# Patient Record
Sex: FEMALE | Race: WHITE | NOT HISPANIC OR LATINO
[De-identification: names, ages, dates, MRNs, and addresses within clinical notes are randomized per-mention and may not be internally consistent; named-entity substitution may affect disease eponyms.]

---

## 2017-01-02 ENCOUNTER — RESULT REVIEW (OUTPATIENT)
Age: 62
End: 2017-01-02

## 2017-01-03 ENCOUNTER — INPATIENT (INPATIENT)
Facility: HOSPITAL | Age: 62
LOS: 1 days | Discharge: HOME | End: 2017-01-05
Attending: PLASTIC SURGERY | Admitting: PLASTIC SURGERY
Payer: COMMERCIAL

## 2017-01-03 VITALS
RESPIRATION RATE: 18 BRPM | HEART RATE: 77 BPM | DIASTOLIC BLOOD PRESSURE: 88 MMHG | TEMPERATURE: 98 F | SYSTOLIC BLOOD PRESSURE: 132 MMHG | OXYGEN SATURATION: 100 %

## 2017-01-03 DIAGNOSIS — Z98.89 OTHER SPECIFIED POSTPROCEDURAL STATES: Chronic | ICD-10-CM

## 2017-01-03 DIAGNOSIS — T79.2XXA TRAUMATIC SECONDARY AND RECURRENT HEMORRHAGE AND SEROMA, INITIAL ENCOUNTER: ICD-10-CM

## 2017-01-03 DIAGNOSIS — Z90.710 ACQUIRED ABSENCE OF BOTH CERVIX AND UTERUS: Chronic | ICD-10-CM

## 2017-01-03 LAB
ALBUMIN SERPL ELPH-MCNC: 3.9 G/DL — SIGNIFICANT CHANGE UP (ref 3.3–5)
ALP SERPL-CCNC: 99 U/L — SIGNIFICANT CHANGE UP (ref 40–120)
ALT FLD-CCNC: 10 U/L — SIGNIFICANT CHANGE UP (ref 4–33)
APTT BLD: 31.5 SEC — SIGNIFICANT CHANGE UP (ref 27.5–37.4)
AST SERPL-CCNC: 12 U/L — SIGNIFICANT CHANGE UP (ref 4–32)
BASE EXCESS BLDV CALC-SCNC: 3.6 MMOL/L — SIGNIFICANT CHANGE UP
BASOPHILS # BLD AUTO: 0.04 K/UL — SIGNIFICANT CHANGE UP (ref 0–0.2)
BASOPHILS NFR BLD AUTO: 0.3 % — SIGNIFICANT CHANGE UP (ref 0–2)
BILIRUB SERPL-MCNC: 0.2 MG/DL — SIGNIFICANT CHANGE UP (ref 0.2–1.2)
BLD GP AB SCN SERPL QL: NEGATIVE — SIGNIFICANT CHANGE UP
BLOOD GAS VENOUS - CREATININE: 0.8 MG/DL — SIGNIFICANT CHANGE UP (ref 0.5–1.3)
BUN SERPL-MCNC: 16 MG/DL — SIGNIFICANT CHANGE UP (ref 7–23)
CALCIUM SERPL-MCNC: 11.4 MG/DL — HIGH (ref 8.4–10.5)
CHLORIDE BLDV-SCNC: 101 MMOL/L — SIGNIFICANT CHANGE UP (ref 96–108)
CHLORIDE SERPL-SCNC: 96 MMOL/L — LOW (ref 98–107)
CO2 SERPL-SCNC: 24 MMOL/L — SIGNIFICANT CHANGE UP (ref 22–31)
CREAT SERPL-MCNC: 0.78 MG/DL — SIGNIFICANT CHANGE UP (ref 0.5–1.3)
EOSINOPHIL # BLD AUTO: 0.15 K/UL — SIGNIFICANT CHANGE UP (ref 0–0.5)
EOSINOPHIL NFR BLD AUTO: 1.2 % — SIGNIFICANT CHANGE UP (ref 0–6)
GAS PNL BLDV: 134 MMOL/L — LOW (ref 136–146)
GLUCOSE BLDV-MCNC: 138 — HIGH (ref 70–99)
GLUCOSE SERPL-MCNC: 153 MG/DL — HIGH (ref 70–99)
GRAM STN FLD: SIGNIFICANT CHANGE UP
HCO3 BLDV-SCNC: 27 MMOL/L — SIGNIFICANT CHANGE UP (ref 20–27)
HCT VFR BLD CALC: 32.4 % — LOW (ref 34.5–45)
HCT VFR BLDV CALC: 33.5 % — LOW (ref 34.5–45)
HGB BLD-MCNC: 10.6 G/DL — LOW (ref 11.5–15.5)
HGB BLDV-MCNC: 10.9 G/DL — LOW (ref 11.5–15.5)
IMM GRANULOCYTES NFR BLD AUTO: 1 % — SIGNIFICANT CHANGE UP (ref 0–1.5)
INR BLD: 1.03 — SIGNIFICANT CHANGE UP (ref 0.87–1.18)
LACTATE BLDV-MCNC: 1.9 MMOL/L — SIGNIFICANT CHANGE UP (ref 0.5–2)
LYMPHOCYTES # BLD AUTO: 2.64 K/UL — SIGNIFICANT CHANGE UP (ref 1–3.3)
LYMPHOCYTES # BLD AUTO: 20.4 % — SIGNIFICANT CHANGE UP (ref 13–44)
MCHC RBC-ENTMCNC: 27.4 PG — SIGNIFICANT CHANGE UP (ref 27–34)
MCHC RBC-ENTMCNC: 32.7 % — SIGNIFICANT CHANGE UP (ref 32–36)
MCV RBC AUTO: 83.7 FL — SIGNIFICANT CHANGE UP (ref 80–100)
MONOCYTES # BLD AUTO: 1.33 K/UL — HIGH (ref 0–0.9)
MONOCYTES NFR BLD AUTO: 10.3 % — SIGNIFICANT CHANGE UP (ref 2–14)
NEUTROPHILS # BLD AUTO: 8.66 K/UL — HIGH (ref 1.8–7.4)
NEUTROPHILS NFR BLD AUTO: 66.8 % — SIGNIFICANT CHANGE UP (ref 43–77)
PCO2 BLDV: 46 MMHG — SIGNIFICANT CHANGE UP (ref 41–51)
PH BLDV: 7.4 PH — SIGNIFICANT CHANGE UP (ref 7.32–7.43)
PLATELET # BLD AUTO: 320 K/UL — SIGNIFICANT CHANGE UP (ref 150–400)
PMV BLD: 9.5 FL — SIGNIFICANT CHANGE UP (ref 7–13)
PO2 BLDV: 44 MMHG — HIGH (ref 35–40)
POTASSIUM BLDV-SCNC: 5.4 MMOL/L — HIGH (ref 3.4–4.5)
POTASSIUM SERPL-MCNC: 3.8 MMOL/L — SIGNIFICANT CHANGE UP (ref 3.5–5.3)
POTASSIUM SERPL-SCNC: 3.8 MMOL/L — SIGNIFICANT CHANGE UP (ref 3.5–5.3)
PROT SERPL-MCNC: 7.8 G/DL — SIGNIFICANT CHANGE UP (ref 6–8.3)
PROTHROM AB SERPL-ACNC: 11.8 SEC — SIGNIFICANT CHANGE UP (ref 10–13.1)
RBC # BLD: 3.87 M/UL — SIGNIFICANT CHANGE UP (ref 3.8–5.2)
RBC # FLD: 14.7 % — HIGH (ref 10.3–14.5)
RH IG SCN BLD-IMP: POSITIVE — SIGNIFICANT CHANGE UP
SAO2 % BLDV: 74.7 % — SIGNIFICANT CHANGE UP (ref 60–85)
SODIUM SERPL-SCNC: 136 MMOL/L — SIGNIFICANT CHANGE UP (ref 135–145)
SPECIMEN SOURCE: SIGNIFICANT CHANGE UP
WBC # BLD: 12.95 K/UL — HIGH (ref 3.8–10.5)
WBC # FLD AUTO: 12.95 K/UL — HIGH (ref 3.8–10.5)

## 2017-01-03 PROCEDURE — 11042 DBRDMT SUBQ TIS 1ST 20SQCM/<: CPT

## 2017-01-03 PROCEDURE — 49900 REPAIR OF ABDOMINAL WALL: CPT | Mod: 78

## 2017-01-03 PROCEDURE — 88304 TISSUE EXAM BY PATHOLOGIST: CPT | Mod: 26

## 2017-01-03 PROCEDURE — 11045 DBRDMT SUBQ TISS EACH ADDL: CPT

## 2017-01-03 RX ORDER — PANTOPRAZOLE SODIUM 20 MG/1
40 TABLET, DELAYED RELEASE ORAL
Qty: 0 | Refills: 0 | Status: DISCONTINUED | OUTPATIENT
Start: 2017-01-03 | End: 2017-01-05

## 2017-01-03 RX ORDER — METOPROLOL TARTRATE 50 MG
200 TABLET ORAL
Qty: 0 | Refills: 0 | Status: DISCONTINUED | OUTPATIENT
Start: 2017-01-03 | End: 2017-01-05

## 2017-01-03 RX ORDER — AMPICILLIN SODIUM AND SULBACTAM SODIUM 250; 125 MG/ML; MG/ML
1.5 INJECTION, POWDER, FOR SUSPENSION INTRAMUSCULAR; INTRAVENOUS EVERY 6 HOURS
Qty: 0 | Refills: 0 | Status: DISCONTINUED | OUTPATIENT
Start: 2017-01-03 | End: 2017-01-05

## 2017-01-03 RX ORDER — ALLOPURINOL 300 MG
100 TABLET ORAL DAILY
Qty: 0 | Refills: 0 | Status: DISCONTINUED | OUTPATIENT
Start: 2017-01-03 | End: 2017-01-05

## 2017-01-03 RX ORDER — GLUCAGON INJECTION, SOLUTION 0.5 MG/.1ML
1 INJECTION, SOLUTION SUBCUTANEOUS ONCE
Qty: 0 | Refills: 0 | Status: DISCONTINUED | OUTPATIENT
Start: 2017-01-03 | End: 2017-01-05

## 2017-01-03 RX ORDER — ASPIRIN/CALCIUM CARB/MAGNESIUM 324 MG
81 TABLET ORAL DAILY
Qty: 0 | Refills: 0 | Status: DISCONTINUED | OUTPATIENT
Start: 2017-01-03 | End: 2017-01-03

## 2017-01-03 RX ORDER — ASPIRIN/CALCIUM CARB/MAGNESIUM 324 MG
81 TABLET ORAL DAILY
Qty: 0 | Refills: 0 | Status: DISCONTINUED | OUTPATIENT
Start: 2017-01-03 | End: 2017-01-05

## 2017-01-03 RX ORDER — DEXTROSE 50 % IN WATER 50 %
1 SYRINGE (ML) INTRAVENOUS ONCE
Qty: 0 | Refills: 0 | Status: DISCONTINUED | OUTPATIENT
Start: 2017-01-03 | End: 2017-01-05

## 2017-01-03 RX ORDER — ACETAMINOPHEN 500 MG
650 TABLET ORAL EVERY 6 HOURS
Qty: 0 | Refills: 0 | Status: DISCONTINUED | OUTPATIENT
Start: 2017-01-03 | End: 2017-01-05

## 2017-01-03 RX ORDER — DIPHENHYDRAMINE HCL 50 MG
50 CAPSULE ORAL EVERY 4 HOURS
Qty: 0 | Refills: 0 | Status: DISCONTINUED | OUTPATIENT
Start: 2017-01-03 | End: 2017-01-05

## 2017-01-03 RX ORDER — INSULIN LISPRO 100/ML
VIAL (ML) SUBCUTANEOUS AT BEDTIME
Qty: 0 | Refills: 0 | Status: DISCONTINUED | OUTPATIENT
Start: 2017-01-03 | End: 2017-01-05

## 2017-01-03 RX ORDER — ONDANSETRON 8 MG/1
4 TABLET, FILM COATED ORAL EVERY 6 HOURS
Qty: 0 | Refills: 0 | Status: DISCONTINUED | OUTPATIENT
Start: 2017-01-03 | End: 2017-01-05

## 2017-01-03 RX ORDER — VALSARTAN 80 MG/1
160 TABLET ORAL DAILY
Qty: 0 | Refills: 0 | Status: DISCONTINUED | OUTPATIENT
Start: 2017-01-03 | End: 2017-01-05

## 2017-01-03 RX ORDER — SODIUM CHLORIDE 9 MG/ML
1000 INJECTION, SOLUTION INTRAVENOUS
Qty: 0 | Refills: 0 | Status: DISCONTINUED | OUTPATIENT
Start: 2017-01-03 | End: 2017-01-05

## 2017-01-03 RX ORDER — DEXTROSE 50 % IN WATER 50 %
25 SYRINGE (ML) INTRAVENOUS ONCE
Qty: 0 | Refills: 0 | Status: DISCONTINUED | OUTPATIENT
Start: 2017-01-03 | End: 2017-01-03

## 2017-01-03 RX ORDER — DEXTROSE 50 % IN WATER 50 %
25 SYRINGE (ML) INTRAVENOUS ONCE
Qty: 0 | Refills: 0 | Status: DISCONTINUED | OUTPATIENT
Start: 2017-01-03 | End: 2017-01-05

## 2017-01-03 RX ORDER — SODIUM CHLORIDE 9 MG/ML
1000 INJECTION, SOLUTION INTRAVENOUS
Qty: 0 | Refills: 0 | Status: DISCONTINUED | OUTPATIENT
Start: 2017-01-03 | End: 2017-01-04

## 2017-01-03 RX ORDER — HEPARIN SODIUM 5000 [USP'U]/ML
5000 INJECTION INTRAVENOUS; SUBCUTANEOUS EVERY 8 HOURS
Qty: 0 | Refills: 0 | Status: DISCONTINUED | OUTPATIENT
Start: 2017-01-03 | End: 2017-01-05

## 2017-01-03 RX ORDER — DOCUSATE SODIUM 100 MG
100 CAPSULE ORAL THREE TIMES A DAY
Qty: 0 | Refills: 0 | Status: DISCONTINUED | OUTPATIENT
Start: 2017-01-03 | End: 2017-01-05

## 2017-01-03 RX ORDER — SPIRONOLACTONE 25 MG/1
25 TABLET, FILM COATED ORAL DAILY
Qty: 0 | Refills: 0 | Status: DISCONTINUED | OUTPATIENT
Start: 2017-01-03 | End: 2017-01-05

## 2017-01-03 RX ORDER — DEXTROSE 50 % IN WATER 50 %
12.5 SYRINGE (ML) INTRAVENOUS ONCE
Qty: 0 | Refills: 0 | Status: DISCONTINUED | OUTPATIENT
Start: 2017-01-03 | End: 2017-01-05

## 2017-01-03 RX ORDER — FENTANYL CITRATE 50 UG/ML
25 INJECTION INTRAVENOUS
Qty: 0 | Refills: 0 | Status: DISCONTINUED | OUTPATIENT
Start: 2017-01-03 | End: 2017-01-03

## 2017-01-03 RX ORDER — HYDROMORPHONE HYDROCHLORIDE 2 MG/ML
0.5 INJECTION INTRAMUSCULAR; INTRAVENOUS; SUBCUTANEOUS
Qty: 0 | Refills: 0 | Status: DISCONTINUED | OUTPATIENT
Start: 2017-01-03 | End: 2017-01-03

## 2017-01-03 RX ORDER — INSULIN LISPRO 100/ML
VIAL (ML) SUBCUTANEOUS
Qty: 0 | Refills: 0 | Status: DISCONTINUED | OUTPATIENT
Start: 2017-01-03 | End: 2017-01-05

## 2017-01-03 RX ORDER — ONDANSETRON 8 MG/1
4 TABLET, FILM COATED ORAL ONCE
Qty: 0 | Refills: 0 | Status: COMPLETED | OUTPATIENT
Start: 2017-01-03 | End: 2017-01-03

## 2017-01-03 RX ORDER — VALSARTAN 80 MG/1
160 TABLET ORAL DAILY
Qty: 0 | Refills: 0 | Status: DISCONTINUED | OUTPATIENT
Start: 2017-01-03 | End: 2017-01-03

## 2017-01-03 RX ORDER — METOPROLOL TARTRATE 50 MG
200 TABLET ORAL
Qty: 0 | Refills: 0 | Status: DISCONTINUED | OUTPATIENT
Start: 2017-01-03 | End: 2017-01-03

## 2017-01-03 RX ADMIN — Medication 100 MILLIGRAM(S): at 21:32

## 2017-01-03 RX ADMIN — HEPARIN SODIUM 5000 UNIT(S): 5000 INJECTION INTRAVENOUS; SUBCUTANEOUS at 21:31

## 2017-01-03 RX ADMIN — ONDANSETRON 4 MILLIGRAM(S): 8 TABLET, FILM COATED ORAL at 18:50

## 2017-01-03 RX ADMIN — HYDROMORPHONE HYDROCHLORIDE 0.5 MILLIGRAM(S): 2 INJECTION INTRAMUSCULAR; INTRAVENOUS; SUBCUTANEOUS at 20:05

## 2017-01-03 RX ADMIN — SODIUM CHLORIDE 75 MILLILITER(S): 9 INJECTION, SOLUTION INTRAVENOUS at 18:30

## 2017-01-03 RX ADMIN — HYDROMORPHONE HYDROCHLORIDE 0.5 MILLIGRAM(S): 2 INJECTION INTRAMUSCULAR; INTRAVENOUS; SUBCUTANEOUS at 19:50

## 2017-01-03 RX ADMIN — Medication 200 MILLIGRAM(S): at 22:23

## 2017-01-03 NOTE — ED ADULT NURSE REASSESSMENT NOTE - NS ED NURSE REASSESS COMMENT FT1
Report received from SHAILESH Issa at 1210. Pt in slot 26, awake, A&Ox3, respirations even unlabored. Pt reporting wound site 'discomfort', denies pain, reporting discomfort as tolerable at this time, denies need for pain medication. VS per flow,  at bedside, pt in no apparent distress at this time. Safety maintained. Will continue to monitor.

## 2017-01-03 NOTE — ED PROVIDER NOTE - OBJECTIVE STATEMENT
61F, h/o DM2, gout, HTN, GERD, L mastectomy 11/16, reconstructive breast surg Dr Merrill using abdo donor site, has been having drainage from abdo wall drainage site worse R side, s/p drain insertion and then removal. Recently opened up by Dr merrill and drainage has increased, also some pain and swelling on L side. No fever, No breast pain/drainage. Review of Symptoms in all systems otherwise negative, except as indicated

## 2017-01-03 NOTE — ED ADULT NURSE NOTE - PMH
Breast tumor  malignant  left breast 08/2016  Diabetes  type II  Gout    Hypertension    Irritable bowel syndrome with diarrhea    Reflux

## 2017-01-03 NOTE — ED ADULT TRIAGE NOTE - CHIEF COMPLAINT QUOTE
s/p b/l mastectomy w reconstruction 11/8/16.  pt c/o increased drainage from incision and a "hole"  to area  (rt abd) x 4 days.  Sent by plastic surgeon for eval.  h/o breast cancer.

## 2017-01-03 NOTE — ED ADULT NURSE REASSESSMENT NOTE - NS ED NURSE REASSESS COMMENT FT1
Pt in slot 26 with bed assignment in [facility]. Pt awake, A&Ox3, respirations even, unlabored. Pt reporting discomfort in wound site area as 'the same as before', site covered with gauze. VS per flow,  at bedside. Pt aware of NPO status, reporting last PO intake as 0800 this morning. 20g PIV intact in R AC. Report given to [facility] SHAILEHS Velasquez. Safety maintained in ED. Pt in slot 26, Pt awake, A&Ox3, respirations even, unlabored. Pt reporting discomfort in wound site area as 'the same as before', site covered with gauze. VS per flow,  at bedside. Pt aware of NPO status, reporting last PO intake as 0800 this morning. 20g PIV intact in R AC. Report given to [facility] SHAILESH Velasquez. Safety maintained in ED.    Addendum: Pt to go to OR, report given to SHAILESH Smiley. Pt aware of plan of care, verbalizes understanding. . Safety maintained in ED.  at bedside.

## 2017-01-03 NOTE — ED PROVIDER NOTE - PSH
H/O total hysterectomy  2002  History of appendectomy    History of cholecystectomy    History of tonsillectomy

## 2017-01-03 NOTE — ED PROVIDER NOTE - GASTROINTESTINAL, MLM
Abdomen soft, non-tender, no guarding. except large horizontal abdo wall surgical wound site with open area on R with copious serosanguinous drainag, also swelling and mild tenderness on L side

## 2017-01-03 NOTE — ED PROVIDER NOTE - MEDICAL DECISION MAKING DETAILS
Large post op seromas in abdo wall. D/w plastic surgery who requests admission to Dr Thomas. Labs sent, NPO

## 2017-01-03 NOTE — ED ADULT NURSE NOTE - OBJECTIVE STATEMENT
pt received to 26, aox3.  pt c/o post op complication, pt had incision to abdomen for the purpose of reconstructive breast surgery, pt has large open area to right side of abdomen where incision was with purulent drainage.  pt arrives with dressing in place, awaitnig MD frye, will monitor

## 2017-01-04 ENCOUNTER — TRANSCRIPTION ENCOUNTER (OUTPATIENT)
Age: 62
End: 2017-01-04

## 2017-01-04 LAB
BUN SERPL-MCNC: 13 MG/DL — SIGNIFICANT CHANGE UP (ref 7–23)
CALCIUM SERPL-MCNC: 10.4 MG/DL — SIGNIFICANT CHANGE UP (ref 8.4–10.5)
CHLORIDE SERPL-SCNC: 99 MMOL/L — SIGNIFICANT CHANGE UP (ref 98–107)
CO2 SERPL-SCNC: 26 MMOL/L — SIGNIFICANT CHANGE UP (ref 22–31)
CREAT SERPL-MCNC: 0.68 MG/DL — SIGNIFICANT CHANGE UP (ref 0.5–1.3)
GLUCOSE SERPL-MCNC: 145 MG/DL — HIGH (ref 70–99)
HBA1C BLD-MCNC: 6.9 % — HIGH (ref 4–5.6)
HCT VFR BLD CALC: 27.6 % — LOW (ref 34.5–45)
HGB BLD-MCNC: 8.9 G/DL — LOW (ref 11.5–15.5)
MCHC RBC-ENTMCNC: 26.6 PG — LOW (ref 27–34)
MCHC RBC-ENTMCNC: 32.2 % — SIGNIFICANT CHANGE UP (ref 32–36)
MCV RBC AUTO: 82.4 FL — SIGNIFICANT CHANGE UP (ref 80–100)
PLATELET # BLD AUTO: 296 K/UL — SIGNIFICANT CHANGE UP (ref 150–400)
PMV BLD: 9.5 FL — SIGNIFICANT CHANGE UP (ref 7–13)
POTASSIUM SERPL-MCNC: 4 MMOL/L — SIGNIFICANT CHANGE UP (ref 3.5–5.3)
POTASSIUM SERPL-SCNC: 4 MMOL/L — SIGNIFICANT CHANGE UP (ref 3.5–5.3)
RBC # BLD: 3.35 M/UL — LOW (ref 3.8–5.2)
RBC # FLD: 14.6 % — HIGH (ref 10.3–14.5)
SODIUM SERPL-SCNC: 140 MMOL/L — SIGNIFICANT CHANGE UP (ref 135–145)
WBC # BLD: 9.17 K/UL — SIGNIFICANT CHANGE UP (ref 3.8–10.5)
WBC # FLD AUTO: 9.17 K/UL — SIGNIFICANT CHANGE UP (ref 3.8–10.5)

## 2017-01-04 RX ORDER — CHLORTHALIDONE 50 MG
25 TABLET ORAL DAILY
Qty: 0 | Refills: 0 | Status: DISCONTINUED | OUTPATIENT
Start: 2017-01-04 | End: 2017-01-05

## 2017-01-04 RX ADMIN — AMPICILLIN SODIUM AND SULBACTAM SODIUM 100 GRAM(S): 250; 125 INJECTION, POWDER, FOR SUSPENSION INTRAMUSCULAR; INTRAVENOUS at 23:54

## 2017-01-04 RX ADMIN — AMPICILLIN SODIUM AND SULBACTAM SODIUM 100 GRAM(S): 250; 125 INJECTION, POWDER, FOR SUSPENSION INTRAMUSCULAR; INTRAVENOUS at 17:06

## 2017-01-04 RX ADMIN — Medication 200 MILLIGRAM(S): at 05:45

## 2017-01-04 RX ADMIN — AMPICILLIN SODIUM AND SULBACTAM SODIUM 100 GRAM(S): 250; 125 INJECTION, POWDER, FOR SUSPENSION INTRAMUSCULAR; INTRAVENOUS at 00:22

## 2017-01-04 RX ADMIN — Medication 100 MILLIGRAM(S): at 13:40

## 2017-01-04 RX ADMIN — HEPARIN SODIUM 5000 UNIT(S): 5000 INJECTION INTRAVENOUS; SUBCUTANEOUS at 21:33

## 2017-01-04 RX ADMIN — Medication 100 MILLIGRAM(S): at 21:33

## 2017-01-04 RX ADMIN — Medication 100 MILLIGRAM(S): at 05:45

## 2017-01-04 RX ADMIN — SPIRONOLACTONE 25 MILLIGRAM(S): 25 TABLET, FILM COATED ORAL at 05:45

## 2017-01-04 RX ADMIN — Medication 1: at 12:25

## 2017-01-04 RX ADMIN — HEPARIN SODIUM 5000 UNIT(S): 5000 INJECTION INTRAVENOUS; SUBCUTANEOUS at 13:40

## 2017-01-04 RX ADMIN — Medication 81 MILLIGRAM(S): at 11:36

## 2017-01-04 RX ADMIN — AMPICILLIN SODIUM AND SULBACTAM SODIUM 100 GRAM(S): 250; 125 INJECTION, POWDER, FOR SUSPENSION INTRAMUSCULAR; INTRAVENOUS at 05:45

## 2017-01-04 RX ADMIN — VALSARTAN 160 MILLIGRAM(S): 80 TABLET ORAL at 05:45

## 2017-01-04 RX ADMIN — Medication 100 MILLIGRAM(S): at 11:36

## 2017-01-04 RX ADMIN — PANTOPRAZOLE SODIUM 40 MILLIGRAM(S): 20 TABLET, DELAYED RELEASE ORAL at 07:48

## 2017-01-04 RX ADMIN — AMPICILLIN SODIUM AND SULBACTAM SODIUM 100 GRAM(S): 250; 125 INJECTION, POWDER, FOR SUSPENSION INTRAMUSCULAR; INTRAVENOUS at 11:36

## 2017-01-04 RX ADMIN — HEPARIN SODIUM 5000 UNIT(S): 5000 INJECTION INTRAVENOUS; SUBCUTANEOUS at 05:45

## 2017-01-04 RX ADMIN — Medication 25 MILLIGRAM(S): at 16:59

## 2017-01-04 RX ADMIN — SODIUM CHLORIDE 75 MILLILITER(S): 9 INJECTION, SOLUTION INTRAVENOUS at 00:24

## 2017-01-04 NOTE — DISCHARGE NOTE ADULT - CARE PROVIDER_API CALL
eFrcho Thomas), Plastic Surgery  [address] [place] [address]  Phone: [phone]  Fax: [phone]

## 2017-01-04 NOTE — DISCHARGE NOTE ADULT - PLAN OF CARE
s/p abdominal wall washout and debridement Call 911 and return to the ED for chest pain, shortness of breath, significant increase in pain, or significant change in color of surgical sites. Please monitor drainage in bulb. -continue HONORIO drains, empty and record output daily  -dressing changes with dry gauze and tape daily or as needed for saturation  -follow-up with Dr. Thomas in 1 week.  Please call [phone] for an appointment.  -Call 911 and return to the ED for chest pain, shortness of breath, significant increase in pain, or significant change in color of surgical sites. Please monitor drainage in bulb. see below Please call your cardiologist and make an appointment to see him early next week.   Please take half of your toprol dose (this would be 100mg twice daily) for now and hold your diovan unless your blood pressure becomes elevated as reconmmended by Dr Simpson.

## 2017-01-04 NOTE — DISCHARGE NOTE ADULT - MEDICATION SUMMARY - MEDICATIONS TO TAKE
I will START or STAY ON the medications listed below when I get home from the hospital:    spironolactone 25 mg oral tablet  -- 1 tab(s) by mouth once a day  -- Indication: For Hypertension    aspirin 81 mg oral tablet  -- 1 tab(s) by mouth once a day  -- Indication: For cardioprotection/LINDA flap protection    Percocet 5/325  -- 1 tab(s) by mouth every 4 hours, As Needed -for severe pain MDD:8  -- Indication: For moderate-severe pain    valsartan  -- 160 milligram(s) by mouth once a day  -- Indication: For Hypertension    metFORMIN 1000 mg oral tablet  -- 1000 milligram(s) by mouth 2 times a day  -- Indication: For Diabetes    allopurinol  -- 100 milligram(s) by mouth once a day  -- Indication: For Gout    pravastatin 40 mg oral tablet  -- 1 tab(s) by mouth once a day  -- Indication: For hyperlipidemia    metoprolol  -- 200 milligram(s) by mouth 2 times a day  -- Indication: For hypertension    chlorthalidone 25 mg oral tablet  -- 1 tab(s) by mouth once a day  -- Indication: For hypertension    docusate sodium 100 mg oral capsule  -- 1 cap(s) by mouth 3 times a day  -- Indication: For constipation secondary to pain medication    Bactrim  mg-160 mg oral tablet  -- 1 tab(s) by mouth 2 times a day  -- Avoid prolonged or excessive exposure to direct and/or artificial sunlight while taking this medication.  Finish all this medication unless otherwise directed by prescriber.  Medication should be taken with plenty of water.    -- Indication: For Abdominal wound infection    omeprazole  -- 20 milligram(s) by mouth once a day  -- Indication: For GERD I will START or STAY ON the medications listed below when I get home from the hospital:    spironolactone 25 mg oral tablet  -- 1 tab(s) by mouth once a day  -- Indication: For Hypertension    Percocet 5/325  -- 1 tab(s) by mouth every 4 hours, As Needed -for severe pain MDD:8  -- Indication: For moderate-severe pain    aspirin 81 mg oral tablet  -- 1 tab(s) by mouth once a day  -- Indication: For cardioprotection/LINDA flap protection    metFORMIN 1000 mg oral tablet  -- 1000 milligram(s) by mouth 2 times a day  -- Indication: For Diabetes    allopurinol  -- 100 milligram(s) by mouth once a day  -- Indication: For Gout    pravastatin 40 mg oral tablet  -- 1 tab(s) by mouth once a day  -- Indication: For hyperlipidemia    metoprolol tartrate 100 mg oral tablet  -- 1 tab(s) by mouth 2 times a day  -- Indication: For hypertension    chlorthalidone 25 mg oral tablet  -- 1 tab(s) by mouth once a day  -- Indication: For hypertension    docusate sodium 100 mg oral capsule  -- 1 cap(s) by mouth 3 times a day  -- Indication: For constipation secondary to pain medication    Bactrim  mg-160 mg oral tablet  -- 1 tab(s) by mouth 2 times a day  -- Avoid prolonged or excessive exposure to direct and/or artificial sunlight while taking this medication.  Finish all this medication unless otherwise directed by prescriber.  Medication should be taken with plenty of water.    -- Indication: For Abdominal wound infection    omeprazole  -- 20 milligram(s) by mouth once a day  -- Indication: For GERD

## 2017-01-04 NOTE — DISCHARGE NOTE ADULT - MEDICATION SUMMARY - MEDICATIONS TO STOP TAKING
I will STOP taking the medications listed below when I get home from the hospital:  None I will STOP taking the medications listed below when I get home from the hospital:    valsartan  -- 160 milligram(s) by mouth once a day

## 2017-01-04 NOTE — DISCHARGE NOTE ADULT - MEDICATION SUMMARY - MEDICATIONS TO CHANGE
I will SWITCH the dose or number of times a day I take the medications listed below when I get home from the hospital:  None I will SWITCH the dose or number of times a day I take the medications listed below when I get home from the hospital:    metoprolol  -- 200 milligram(s) by mouth 2 times a day

## 2017-01-04 NOTE — DISCHARGE NOTE ADULT - CONDITIONS AT DISCHARGE
Pt is alert and oriented. Vital signs are stable. Pt is tolerating regular diet. Glucose level maintained. No nausea/vomiting. Pt verbalizes understanding of HONORIO teaching. Reinforcement done with teach back. Supplies given to pt.

## 2017-01-04 NOTE — DISCHARGE NOTE ADULT - ADDITIONAL INSTRUCTIONS
Please follow up with Dr. Thomas within x1 week after discharge from the hospital. You may call [phone] to schedule an appointment. Please follow up with your primary care provider and cardiologist within one week regarding your recent hospitalization.

## 2017-01-04 NOTE — DISCHARGE NOTE ADULT - PATIENT PORTAL LINK FT
“You can access the Follow[facility] Patient Portal, offered by [facility], by registering with the following website: [email or web address]”

## 2017-01-04 NOTE — DISCHARGE NOTE ADULT - CARE PROVIDERS DIRECT ADDRESSES
[email or web address]

## 2017-01-04 NOTE — DISCHARGE NOTE ADULT - CARE PLAN
Principal Discharge DX:	Seroma of musculoskeletal structure after musculoskeletal system procedure  Goal:	s/p abdominal wall washout and debridement  Instructions for follow-up, activity and diet:	Call 911 and return to the ED for chest pain, shortness of breath, significant increase in pain, or significant change in color of surgical sites. Please monitor drainage in bulb. Principal Discharge DX:	Seroma of musculoskeletal structure after musculoskeletal system procedure  Goal:	s/p abdominal wall washout and debridement  Instructions for follow-up, activity and diet:	-continue HONORIO drains, empty and record output daily  -dressing changes with dry gauze and tape daily or as needed for saturation  -follow-up with Dr. Thomas in 1 week.  Please call [phone] for an appointment.  -Call 911 and return to the ED for chest pain, shortness of breath, significant increase in pain, or significant change in color of surgical sites. Please monitor drainage in bulb. Principal Discharge DX:	Seroma of musculoskeletal structure after musculoskeletal system procedure  Goal:	s/p abdominal wall washout and debridement  Instructions for follow-up, activity and diet:	-continue HONORIO drains, empty and record output daily  -dressing changes with dry gauze and tape daily or as needed for saturation  -follow-up with Dr. Thomas in 1 week.  Please call [phone] for an appointment.  -Call 911 and return to the ED for chest pain, shortness of breath, significant increase in pain, or significant change in color of surgical sites. Please monitor drainage in bulb. Principal Discharge DX:	Seroma of musculoskeletal structure after musculoskeletal system procedure  Goal:	s/p abdominal wall washout and debridement  Instructions for follow-up, activity and diet:	-continue HONORIO drains, empty and record output daily  -dressing changes with dry gauze and tape daily or as needed for saturation  -follow-up with Dr. Thomas in 1 week.  Please call [phone] for an appointment.  -Call 911 and return to the ED for chest pain, shortness of breath, significant increase in pain, or significant change in color of surgical sites. Please monitor drainage in bulb. Principal Discharge DX:	Seroma of musculoskeletal structure after musculoskeletal system procedure  Goal:	s/p abdominal wall washout and debridement  Instructions for follow-up, activity and diet:	-continue HONORIO drains, empty and record output daily  -dressing changes with dry gauze and tape daily or as needed for saturation  -follow-up with Dr. Thomas in 1 week.  Please call [phone] for an appointment.  -Call 911 and return to the ED for chest pain, shortness of breath, significant increase in pain, or significant change in color of surgical sites. Please monitor drainage in bulb.  Secondary Diagnosis:	Secondary hypertension  Goal:	see below  Instructions for follow-up, activity and diet:	Please call your cardiologist and make an appointment to see him early next week.   Please take half of your toprol dose (this would be 100mg twice daily) for now and hold your diovan unless your blood pressure becomes elevated as reconmmended by Dr Simpson. Principal Discharge DX:	Seroma of musculoskeletal structure after musculoskeletal system procedure  Goal:	s/p abdominal wall washout and debridement  Instructions for follow-up, activity and diet:	-continue HONORIO drains, empty and record output daily  -dressing changes with dry gauze and tape daily or as needed for saturation  -follow-up with Dr. Thomas in 1 week.  Please call [phone] for an appointment.  -Call 911 and return to the ED for chest pain, shortness of breath, significant increase in pain, or significant change in color of surgical sites. Please monitor drainage in bulb.  Secondary Diagnosis:	Secondary hypertension  Goal:	see below  Instructions for follow-up, activity and diet:	Please call your cardiologist and make an appointment to see him early next week.   Please take half of your toprol dose (this would be 100mg twice daily) for now and hold your diovan unless your blood pressure becomes elevated as reconmmended by Dr Simpson.

## 2017-01-04 NOTE — DISCHARGE NOTE ADULT - HOSPITAL COURSE
60 y/o woman s/p left breast LINDA 11/8/16 presented to the office presented with drainage from the abdominal surgical site. Patient underwent placement of a drainage catheter 12/9/16 in the office. The drain fell out 12/31/16 and she presented to the ED with continued drainage of abdominal fluid. Cultures were taken from the abdominal fluid. On 1/3/16 she underwent abdominal wall washout and debridement with 2 HONORIO drains left in place. She was started on Unasyn and was switched to __________ once _________ grew from the cultures. At time of discharge, she was hemodynamically stable, voiding, tolerating diet and pain was well controlled. 60 y/o woman s/p left breast LINDA 11/8/16 presented to the office presented with drainage from the abdominal surgical site. Patient underwent placement of a drainage catheter 12/9/16 in the office. The drain fell out 12/31/16 and she presented to the ED with continued drainage of abdominal fluid. Cultures were taken from the abdominal fluid. On 1/3/16 she underwent abdominal wall washout and debridement with 2 HONORIO drains left in place. She was started on Unasyn and was discharged with Bactrim once sensitive Staph Aureus and Proteus specied grew from the cultures. At time of discharge, she was hemodynamically stable, voiding, tolerating diet and pain was well controlled.

## 2017-01-04 NOTE — DISCHARGE NOTE ADULT - INSTRUCTIONS
The patient may resume a regular diet and activity. Take medications as prescribed. Call MD or go to ER for:   - pain unrelieved by prescribed pain meds   - any excessive bleeding, pus/ drainage from incision site   -temp greater than 100.4  - persistent nausea  - vomiting

## 2017-01-05 VITALS
HEART RATE: 103 BPM | SYSTOLIC BLOOD PRESSURE: 133 MMHG | TEMPERATURE: 99 F | DIASTOLIC BLOOD PRESSURE: 55 MMHG | RESPIRATION RATE: 18 BRPM | OXYGEN SATURATION: 96 %

## 2017-01-05 LAB
-  AMIKACIN: SIGNIFICANT CHANGE UP
-  AMPICILLIN/SULBACTAM: SIGNIFICANT CHANGE UP
-  AMPICILLIN: SIGNIFICANT CHANGE UP
-  AZTREONAM: SIGNIFICANT CHANGE UP
-  CEFAZOLIN: SIGNIFICANT CHANGE UP
-  CEFAZOLIN: SIGNIFICANT CHANGE UP
-  CEFEPIME: SIGNIFICANT CHANGE UP
-  CEFOXITIN: SIGNIFICANT CHANGE UP
-  CEFTAZIDIME: SIGNIFICANT CHANGE UP
-  CEFTRIAXONE: SIGNIFICANT CHANGE UP
-  CIPROFLOXACIN: SIGNIFICANT CHANGE UP
-  CIPROFLOXACIN: SIGNIFICANT CHANGE UP
-  CLINDAMYCIN: SIGNIFICANT CHANGE UP
-  ERTAPENEM: SIGNIFICANT CHANGE UP
-  ERYTHROMYCIN: SIGNIFICANT CHANGE UP
-  GENTAMICIN: SIGNIFICANT CHANGE UP
-  GENTAMICIN: SIGNIFICANT CHANGE UP
-  LEVOFLOXACIN: SIGNIFICANT CHANGE UP
-  MEROPENEM: SIGNIFICANT CHANGE UP
-  MOXIFLOXACIN(AEROBIC): SIGNIFICANT CHANGE UP
-  OXACILLIN: SIGNIFICANT CHANGE UP
-  PENICILLIN: SIGNIFICANT CHANGE UP
-  PIPERACILLIN/TAZOBACTAM: SIGNIFICANT CHANGE UP
-  RIFAMPIN.: SIGNIFICANT CHANGE UP
-  TETRACYCLINE: SIGNIFICANT CHANGE UP
-  TOBRAMYCIN: SIGNIFICANT CHANGE UP
-  TRIMETHOPRIM/SULFAMETHOXAZOLE: SIGNIFICANT CHANGE UP
-  TRIMETHOPRIM/SULFAMETHOXAZOLE: SIGNIFICANT CHANGE UP
-  VANCOMYCIN: SIGNIFICANT CHANGE UP
APPEARANCE UR: CLEAR — SIGNIFICANT CHANGE UP
BILIRUB UR-MCNC: NEGATIVE — SIGNIFICANT CHANGE UP
BLOOD UR QL VISUAL: NEGATIVE — SIGNIFICANT CHANGE UP
COLOR SPEC: SIGNIFICANT CHANGE UP
GLUCOSE UR-MCNC: NEGATIVE — SIGNIFICANT CHANGE UP
GRAM STN FLD: SIGNIFICANT CHANGE UP
KETONES UR-MCNC: NEGATIVE — SIGNIFICANT CHANGE UP
LEUKOCYTE ESTERASE UR-ACNC: NEGATIVE — SIGNIFICANT CHANGE UP
METHOD TYPE: SIGNIFICANT CHANGE UP
METHOD TYPE: SIGNIFICANT CHANGE UP
NITRITE UR-MCNC: NEGATIVE — SIGNIFICANT CHANGE UP
ORGANISM # SPEC MICROSCOPIC CNT: SIGNIFICANT CHANGE UP
PH UR: 6.5 — SIGNIFICANT CHANGE UP (ref 4.6–8)
PROT UR-MCNC: NEGATIVE — SIGNIFICANT CHANGE UP
SP GR SPEC: 1.02 — SIGNIFICANT CHANGE UP (ref 1–1.03)
SQUAMOUS # UR AUTO: SIGNIFICANT CHANGE UP
UROBILINOGEN FLD QL: NORMAL E.U. — SIGNIFICANT CHANGE UP (ref 0.1–0.2)
WBC UR QL: SIGNIFICANT CHANGE UP (ref 0–?)

## 2017-01-05 RX ORDER — METOPROLOL TARTRATE 50 MG
1 TABLET ORAL
Qty: 0 | Refills: 0 | DISCHARGE
Start: 2017-01-05

## 2017-01-05 RX ORDER — AZTREONAM 2 G
1 VIAL (EA) INJECTION
Qty: 20 | Refills: 0
Start: 2017-01-05 | End: 2017-01-15

## 2017-01-05 RX ADMIN — Medication 81 MILLIGRAM(S): at 13:46

## 2017-01-05 RX ADMIN — Medication 100 MILLIGRAM(S): at 06:03

## 2017-01-05 RX ADMIN — HEPARIN SODIUM 5000 UNIT(S): 5000 INJECTION INTRAVENOUS; SUBCUTANEOUS at 13:45

## 2017-01-05 RX ADMIN — SPIRONOLACTONE 25 MILLIGRAM(S): 25 TABLET, FILM COATED ORAL at 06:02

## 2017-01-05 RX ADMIN — AMPICILLIN SODIUM AND SULBACTAM SODIUM 100 GRAM(S): 250; 125 INJECTION, POWDER, FOR SUSPENSION INTRAMUSCULAR; INTRAVENOUS at 13:45

## 2017-01-05 RX ADMIN — Medication 1: at 08:28

## 2017-01-05 RX ADMIN — Medication 650 MILLIGRAM(S): at 10:49

## 2017-01-05 RX ADMIN — Medication 25 MILLIGRAM(S): at 06:11

## 2017-01-05 RX ADMIN — Medication 650 MILLIGRAM(S): at 11:30

## 2017-01-05 RX ADMIN — AMPICILLIN SODIUM AND SULBACTAM SODIUM 100 GRAM(S): 250; 125 INJECTION, POWDER, FOR SUSPENSION INTRAMUSCULAR; INTRAVENOUS at 06:04

## 2017-01-05 RX ADMIN — HEPARIN SODIUM 5000 UNIT(S): 5000 INJECTION INTRAVENOUS; SUBCUTANEOUS at 06:03

## 2017-01-05 RX ADMIN — Medication 100 MILLIGRAM(S): at 13:46

## 2017-01-05 RX ADMIN — Medication 100 MILLIGRAM(S): at 13:45

## 2017-01-05 RX ADMIN — PANTOPRAZOLE SODIUM 40 MILLIGRAM(S): 20 TABLET, DELAYED RELEASE ORAL at 06:12

## 2017-01-06 LAB
BACTERIA UR CULT: SIGNIFICANT CHANGE UP
SPECIMEN SOURCE: SIGNIFICANT CHANGE UP

## 2017-01-10 LAB — SURGICAL PATHOLOGY STUDY: SIGNIFICANT CHANGE UP

## 2017-01-11 ENCOUNTER — APPOINTMENT (OUTPATIENT)
Dept: PLASTIC SURGERY | Facility: CLINIC | Age: 62
End: 2017-01-11

## 2017-01-18 ENCOUNTER — APPOINTMENT (OUTPATIENT)
Dept: PLASTIC SURGERY | Facility: CLINIC | Age: 62
End: 2017-01-18

## 2017-01-25 ENCOUNTER — APPOINTMENT (OUTPATIENT)
Dept: PLASTIC SURGERY | Facility: CLINIC | Age: 62
End: 2017-01-25

## 2017-02-10 ENCOUNTER — APPOINTMENT (OUTPATIENT)
Dept: PLASTIC SURGERY | Facility: CLINIC | Age: 62
End: 2017-02-10

## 2017-02-14 ENCOUNTER — APPOINTMENT (OUTPATIENT)
Dept: ENDOCRINOLOGY | Facility: CLINIC | Age: 62
End: 2017-02-14

## 2017-02-21 ENCOUNTER — APPOINTMENT (OUTPATIENT)
Dept: SURGICAL ONCOLOGY | Facility: CLINIC | Age: 62
End: 2017-02-21

## 2017-02-21 VITALS
HEIGHT: 61.81 IN | DIASTOLIC BLOOD PRESSURE: 85 MMHG | HEART RATE: 89 BPM | TEMPERATURE: 97.8 F | OXYGEN SATURATION: 95 % | WEIGHT: 198 LBS | BODY MASS INDEX: 36.44 KG/M2 | SYSTOLIC BLOOD PRESSURE: 142 MMHG

## 2017-03-21 ENCOUNTER — APPOINTMENT (OUTPATIENT)
Dept: ENDOCRINOLOGY | Facility: CLINIC | Age: 62
End: 2017-03-21

## 2017-03-21 ENCOUNTER — LABORATORY RESULT (OUTPATIENT)
Age: 62
End: 2017-03-21

## 2017-03-21 VITALS
HEART RATE: 81 BPM | DIASTOLIC BLOOD PRESSURE: 85 MMHG | HEIGHT: 61.81 IN | RESPIRATION RATE: 12 BRPM | SYSTOLIC BLOOD PRESSURE: 127 MMHG | OXYGEN SATURATION: 96 % | WEIGHT: 200 LBS | BODY MASS INDEX: 36.8 KG/M2

## 2017-03-22 ENCOUNTER — APPOINTMENT (OUTPATIENT)
Dept: PLASTIC SURGERY | Facility: CLINIC | Age: 62
End: 2017-03-22

## 2017-03-30 LAB
ALBUMIN SERPL ELPH-MCNC: 4.3 G/DL
ALP BLD-CCNC: 92 U/L
ALT SERPL-CCNC: 36 U/L
ANION GAP SERPL CALC-SCNC: 19 MMOL/L
AST SERPL-CCNC: 32 U/L
BILIRUB SERPL-MCNC: 0.2 MG/DL
BUN SERPL-MCNC: 16 MG/DL
CA-I SERPL-SCNC: 1.48 MMOL/L
CALCIUM SERPL-MCNC: 11.4 MG/DL
CALCIUM SERPL-MCNC: 11.4 MG/DL
CHLORIDE SERPL-SCNC: 100 MMOL/L
CHOLEST SERPL-MCNC: 237 MG/DL
CHOLEST/HDLC SERPL: 5 RATIO
CO2 SERPL-SCNC: 20 MMOL/L
CREAT SERPL-MCNC: 0.77 MG/DL
CREAT SPEC-SCNC: 184 MG/DL
GLUCOSE SERPL-MCNC: 147 MG/DL
HDLC SERPL-MCNC: 47 MG/DL
LDLC SERPL CALC-MCNC: 117 MG/DL
MICROALBUMIN 24H UR DL<=1MG/L-MCNC: 1.9 MG/DL
MICROALBUMIN/CREAT 24H UR-RTO: 10 UG/MG
PARATHYROID HORMONE INTACT: 79 PG/ML
PHOSPHATE SERPL-MCNC: 3.2 MG/DL
POTASSIUM SERPL-SCNC: 4.4 MMOL/L
PROT SERPL-MCNC: 7.3 G/DL
SODIUM SERPL-SCNC: 139 MMOL/L
TRIGL SERPL-MCNC: 364 MG/DL

## 2017-04-24 ENCOUNTER — APPOINTMENT (OUTPATIENT)
Dept: PULMONOLOGY | Facility: CLINIC | Age: 62
End: 2017-04-24

## 2017-05-16 ENCOUNTER — APPOINTMENT (OUTPATIENT)
Dept: ENDOCRINOLOGY | Facility: CLINIC | Age: 62
End: 2017-05-16

## 2017-05-16 VITALS
HEART RATE: 96 BPM | BODY MASS INDEX: 37.91 KG/M2 | DIASTOLIC BLOOD PRESSURE: 88 MMHG | SYSTOLIC BLOOD PRESSURE: 146 MMHG | RESPIRATION RATE: 12 BRPM | OXYGEN SATURATION: 95 % | HEIGHT: 61.81 IN | WEIGHT: 206 LBS

## 2017-05-17 ENCOUNTER — APPOINTMENT (OUTPATIENT)
Dept: PLASTIC SURGERY | Facility: CLINIC | Age: 62
End: 2017-05-17

## 2017-05-22 ENCOUNTER — OUTPATIENT (OUTPATIENT)
Dept: OUTPATIENT SERVICES | Facility: HOSPITAL | Age: 62
LOS: 1 days | Discharge: HOME | End: 2017-05-22

## 2017-05-22 ENCOUNTER — APPOINTMENT (OUTPATIENT)
Dept: PULMONOLOGY | Facility: CLINIC | Age: 62
End: 2017-05-22

## 2017-05-22 VITALS
HEART RATE: 76 BPM | SYSTOLIC BLOOD PRESSURE: 130 MMHG | BODY MASS INDEX: 37.91 KG/M2 | HEIGHT: 61.81 IN | RESPIRATION RATE: 17 BRPM | OXYGEN SATURATION: 95 % | WEIGHT: 206 LBS | DIASTOLIC BLOOD PRESSURE: 80 MMHG

## 2017-05-22 DIAGNOSIS — Z98.89 OTHER SPECIFIED POSTPROCEDURAL STATES: Chronic | ICD-10-CM

## 2017-05-22 DIAGNOSIS — Z90.710 ACQUIRED ABSENCE OF BOTH CERVIX AND UTERUS: Chronic | ICD-10-CM

## 2017-05-22 DIAGNOSIS — F45.8 OTHER SOMATOFORM DISORDERS: ICD-10-CM

## 2017-05-22 DIAGNOSIS — J06.9 ACUTE UPPER RESPIRATORY INFECTION, UNSPECIFIED: ICD-10-CM

## 2017-05-22 DIAGNOSIS — D05.10 INTRADUCTAL CARCINOMA IN SITU OF UNSPECIFIED BREAST: ICD-10-CM

## 2017-05-23 ENCOUNTER — APPOINTMENT (OUTPATIENT)
Dept: HEMATOLOGY ONCOLOGY | Facility: CLINIC | Age: 62
End: 2017-05-23

## 2017-05-23 VITALS
WEIGHT: 206.13 LBS | HEART RATE: 96 BPM | BODY MASS INDEX: 37.93 KG/M2 | OXYGEN SATURATION: 98 % | DIASTOLIC BLOOD PRESSURE: 89 MMHG | TEMPERATURE: 98.3 F | RESPIRATION RATE: 16 BRPM | SYSTOLIC BLOOD PRESSURE: 149 MMHG

## 2017-06-20 LAB
25(OH)D3 SERPL-MCNC: 27.7 NG/ML
ALBUMIN SERPL ELPH-MCNC: 5 G/DL
ALP BLD-CCNC: 106 U/L
ALT SERPL-CCNC: 32 U/L
ANION GAP SERPL CALC-SCNC: 21 MMOL/L
AST SERPL-CCNC: 30 U/L
BILIRUB SERPL-MCNC: 0.3 MG/DL
BUN SERPL-MCNC: 19 MG/DL
CALCIUM SERPL-MCNC: 11.2 MG/DL
CALCIUM SERPL-MCNC: 11.2 MG/DL
CHLORIDE SERPL-SCNC: 98 MMOL/L
CHOLEST SERPL-MCNC: 184 MG/DL
CHOLEST/HDLC SERPL: 4.1 RATIO
CO2 SERPL-SCNC: 21 MMOL/L
CREAT SERPL-MCNC: 0.92 MG/DL
GLUCOSE SERPL-MCNC: 154 MG/DL
HBA1C MFR BLD HPLC: 7.4 %
HDLC SERPL-MCNC: 45 MG/DL
LDLC SERPL CALC-MCNC: 77 MG/DL
PARATHYROID HORMONE INTACT: 63 PG/ML
PHOSPHATE SERPL-MCNC: 4.6 MG/DL
POTASSIUM SERPL-SCNC: 4.1 MMOL/L
PROT SERPL-MCNC: 8.4 G/DL
SODIUM SERPL-SCNC: 140 MMOL/L
TRIGL SERPL-MCNC: 312 MG/DL

## 2017-08-22 ENCOUNTER — APPOINTMENT (OUTPATIENT)
Dept: SURGICAL ONCOLOGY | Facility: CLINIC | Age: 62
End: 2017-08-22
Payer: COMMERCIAL

## 2017-08-22 VITALS
DIASTOLIC BLOOD PRESSURE: 89 MMHG | HEIGHT: 61 IN | WEIGHT: 201 LBS | TEMPERATURE: 98 F | BODY MASS INDEX: 37.95 KG/M2 | RESPIRATION RATE: 16 BRPM | HEART RATE: 116 BPM | OXYGEN SATURATION: 97 % | SYSTOLIC BLOOD PRESSURE: 156 MMHG

## 2017-08-22 PROCEDURE — 99214 OFFICE O/P EST MOD 30 MIN: CPT

## 2017-09-14 ENCOUNTER — FORM ENCOUNTER (OUTPATIENT)
Age: 62
End: 2017-09-14

## 2017-09-15 ENCOUNTER — OUTPATIENT (OUTPATIENT)
Dept: OUTPATIENT SERVICES | Facility: HOSPITAL | Age: 62
LOS: 1 days | End: 2017-09-15
Payer: COMMERCIAL

## 2017-09-15 ENCOUNTER — APPOINTMENT (OUTPATIENT)
Dept: MAMMOGRAPHY | Facility: IMAGING CENTER | Age: 62
End: 2017-09-15
Payer: COMMERCIAL

## 2017-09-15 ENCOUNTER — APPOINTMENT (OUTPATIENT)
Dept: ULTRASOUND IMAGING | Facility: IMAGING CENTER | Age: 62
End: 2017-09-15
Payer: COMMERCIAL

## 2017-09-15 DIAGNOSIS — Z98.89 OTHER SPECIFIED POSTPROCEDURAL STATES: Chronic | ICD-10-CM

## 2017-09-15 DIAGNOSIS — Z90.710 ACQUIRED ABSENCE OF BOTH CERVIX AND UTERUS: Chronic | ICD-10-CM

## 2017-09-15 DIAGNOSIS — D05.10 INTRADUCTAL CARCINOMA IN SITU OF UNSPECIFIED BREAST: ICD-10-CM

## 2017-09-15 PROCEDURE — 76641 ULTRASOUND BREAST COMPLETE: CPT | Mod: 26,RT

## 2017-09-15 PROCEDURE — G0206: CPT | Mod: 26,RT

## 2017-09-15 PROCEDURE — G0279: CPT | Mod: 26

## 2017-09-15 PROCEDURE — G0279: CPT

## 2017-09-15 PROCEDURE — 76641 ULTRASOUND BREAST COMPLETE: CPT

## 2017-09-15 PROCEDURE — 77065 DX MAMMO INCL CAD UNI: CPT

## 2017-09-25 ENCOUNTER — APPOINTMENT (OUTPATIENT)
Dept: PULMONOLOGY | Facility: CLINIC | Age: 62
End: 2017-09-25
Payer: COMMERCIAL

## 2017-09-25 VITALS
DIASTOLIC BLOOD PRESSURE: 82 MMHG | HEART RATE: 79 BPM | WEIGHT: 195 LBS | SYSTOLIC BLOOD PRESSURE: 134 MMHG | HEIGHT: 61 IN | RESPIRATION RATE: 17 BRPM | OXYGEN SATURATION: 98 % | BODY MASS INDEX: 36.82 KG/M2

## 2017-09-25 PROCEDURE — 99214 OFFICE O/P EST MOD 30 MIN: CPT

## 2017-11-07 ENCOUNTER — OUTPATIENT (OUTPATIENT)
Dept: OUTPATIENT SERVICES | Facility: HOSPITAL | Age: 62
LOS: 1 days | Discharge: HOME | End: 2017-11-07

## 2017-11-07 DIAGNOSIS — Z98.89 OTHER SPECIFIED POSTPROCEDURAL STATES: Chronic | ICD-10-CM

## 2017-11-07 DIAGNOSIS — D05.10 INTRADUCTAL CARCINOMA IN SITU OF UNSPECIFIED BREAST: ICD-10-CM

## 2017-11-07 DIAGNOSIS — Z90.710 ACQUIRED ABSENCE OF BOTH CERVIX AND UTERUS: Chronic | ICD-10-CM

## 2017-11-09 ENCOUNTER — APPOINTMENT (OUTPATIENT)
Dept: HEMATOLOGY ONCOLOGY | Facility: CLINIC | Age: 62
End: 2017-11-09
Payer: COMMERCIAL

## 2017-11-09 VITALS
OXYGEN SATURATION: 98 % | BODY MASS INDEX: 38.24 KG/M2 | DIASTOLIC BLOOD PRESSURE: 90 MMHG | TEMPERATURE: 98.3 F | RESPIRATION RATE: 16 BRPM | SYSTOLIC BLOOD PRESSURE: 142 MMHG | HEART RATE: 95 BPM | WEIGHT: 202.38 LBS

## 2017-11-09 PROCEDURE — 99214 OFFICE O/P EST MOD 30 MIN: CPT

## 2017-11-09 RX ORDER — AZITHROMYCIN 250 MG/1
250 TABLET, FILM COATED ORAL
Qty: 1 | Refills: 0 | Status: DISCONTINUED | COMMUNITY
Start: 2017-05-22 | End: 2017-11-09

## 2017-11-09 RX ORDER — SULFAMETHOXAZOLE AND TRIMETHOPRIM 800; 160 MG/1; MG/1
800-160 TABLET ORAL TWICE DAILY
Qty: 10 | Refills: 0 | Status: DISCONTINUED | COMMUNITY
Start: 2017-01-11 | End: 2017-11-09

## 2017-11-09 RX ORDER — NAPROXEN 500 MG/1
500 TABLET ORAL
Qty: 20 | Refills: 0 | Status: DISCONTINUED | COMMUNITY
Start: 2017-08-21

## 2018-02-13 ENCOUNTER — APPOINTMENT (OUTPATIENT)
Dept: SURGICAL ONCOLOGY | Facility: CLINIC | Age: 63
End: 2018-02-13

## 2018-02-13 ENCOUNTER — APPOINTMENT (OUTPATIENT)
Dept: SURGICAL ONCOLOGY | Facility: CLINIC | Age: 63
End: 2018-02-13
Payer: COMMERCIAL

## 2018-02-13 VITALS
HEIGHT: 61 IN | WEIGHT: 202 LBS | HEART RATE: 96 BPM | OXYGEN SATURATION: 100 % | SYSTOLIC BLOOD PRESSURE: 140 MMHG | RESPIRATION RATE: 15 BRPM | BODY MASS INDEX: 38.14 KG/M2 | DIASTOLIC BLOOD PRESSURE: 85 MMHG

## 2018-02-13 DIAGNOSIS — Z80.3 FAMILY HISTORY OF MALIGNANT NEOPLASM OF BREAST: ICD-10-CM

## 2018-02-13 PROCEDURE — 99215 OFFICE O/P EST HI 40 MIN: CPT

## 2018-03-12 ENCOUNTER — APPOINTMENT (OUTPATIENT)
Dept: PULMONOLOGY | Facility: CLINIC | Age: 63
End: 2018-03-12
Payer: COMMERCIAL

## 2018-03-12 VITALS
SYSTOLIC BLOOD PRESSURE: 124 MMHG | WEIGHT: 202 LBS | HEART RATE: 80 BPM | BODY MASS INDEX: 38.14 KG/M2 | HEIGHT: 61 IN | OXYGEN SATURATION: 98 % | DIASTOLIC BLOOD PRESSURE: 80 MMHG

## 2018-03-12 PROCEDURE — 99214 OFFICE O/P EST MOD 30 MIN: CPT

## 2018-03-26 ENCOUNTER — FORM ENCOUNTER (OUTPATIENT)
Age: 63
End: 2018-03-26

## 2018-03-27 ENCOUNTER — APPOINTMENT (OUTPATIENT)
Dept: MAMMOGRAPHY | Facility: IMAGING CENTER | Age: 63
End: 2018-03-27
Payer: COMMERCIAL

## 2018-03-27 ENCOUNTER — OUTPATIENT (OUTPATIENT)
Dept: OUTPATIENT SERVICES | Facility: HOSPITAL | Age: 63
LOS: 1 days | End: 2018-03-27
Payer: COMMERCIAL

## 2018-03-27 DIAGNOSIS — Z98.89 OTHER SPECIFIED POSTPROCEDURAL STATES: Chronic | ICD-10-CM

## 2018-03-27 DIAGNOSIS — Z00.8 ENCOUNTER FOR OTHER GENERAL EXAMINATION: ICD-10-CM

## 2018-03-27 DIAGNOSIS — Z90.710 ACQUIRED ABSENCE OF BOTH CERVIX AND UTERUS: Chronic | ICD-10-CM

## 2018-03-27 PROCEDURE — G0279: CPT | Mod: 26

## 2018-03-27 PROCEDURE — 77065 DX MAMMO INCL CAD UNI: CPT

## 2018-03-27 PROCEDURE — 77065 DX MAMMO INCL CAD UNI: CPT | Mod: 26,RT

## 2018-03-27 PROCEDURE — G0279: CPT

## 2018-05-03 ENCOUNTER — OUTPATIENT (OUTPATIENT)
Dept: OUTPATIENT SERVICES | Facility: HOSPITAL | Age: 63
LOS: 1 days | Discharge: HOME | End: 2018-05-03

## 2018-05-03 DIAGNOSIS — Z90.710 ACQUIRED ABSENCE OF BOTH CERVIX AND UTERUS: Chronic | ICD-10-CM

## 2018-05-03 DIAGNOSIS — D05.10 INTRADUCTAL CARCINOMA IN SITU OF UNSPECIFIED BREAST: ICD-10-CM

## 2018-05-03 DIAGNOSIS — Z98.89 OTHER SPECIFIED POSTPROCEDURAL STATES: Chronic | ICD-10-CM

## 2018-05-07 ENCOUNTER — APPOINTMENT (OUTPATIENT)
Dept: HEMATOLOGY ONCOLOGY | Facility: CLINIC | Age: 63
End: 2018-05-07
Payer: COMMERCIAL

## 2018-05-07 ENCOUNTER — RESULT REVIEW (OUTPATIENT)
Age: 63
End: 2018-05-07

## 2018-05-07 ENCOUNTER — LABORATORY RESULT (OUTPATIENT)
Age: 63
End: 2018-05-07

## 2018-05-07 VITALS
WEIGHT: 203.91 LBS | SYSTOLIC BLOOD PRESSURE: 156 MMHG | OXYGEN SATURATION: 98 % | TEMPERATURE: 97.8 F | HEART RATE: 89 BPM | BODY MASS INDEX: 38.53 KG/M2 | RESPIRATION RATE: 16 BRPM | DIASTOLIC BLOOD PRESSURE: 84 MMHG

## 2018-05-07 LAB
ALBUMIN SERPL ELPH-MCNC: 4.4 G/DL
ALP BLD-CCNC: 108 U/L
ALT SERPL-CCNC: 30 U/L
ANION GAP SERPL CALC-SCNC: 15 MMOL/L
AST SERPL-CCNC: 25 U/L
BASOPHILS # BLD AUTO: 0 K/UL — SIGNIFICANT CHANGE UP (ref 0–0.2)
BASOPHILS NFR BLD AUTO: 0.6 % — SIGNIFICANT CHANGE UP (ref 0–2)
BILIRUB SERPL-MCNC: <0.2 MG/DL
BUN SERPL-MCNC: 18 MG/DL
CALCIUM SERPL-MCNC: 11.2 MG/DL
CHLORIDE SERPL-SCNC: 101 MMOL/L
CO2 SERPL-SCNC: 25 MMOL/L
CREAT SERPL-MCNC: 1.05 MG/DL
EOSINOPHIL # BLD AUTO: 0.2 K/UL — SIGNIFICANT CHANGE UP (ref 0–0.5)
EOSINOPHIL NFR BLD AUTO: 2.4 % — SIGNIFICANT CHANGE UP (ref 0–6)
GLUCOSE SERPL-MCNC: 144 MG/DL
HCT VFR BLD CALC: 33.6 % — LOW (ref 34.5–45)
HGB BLD-MCNC: 11.3 G/DL — LOW (ref 11.5–15.5)
LDH SERPL-CCNC: 112 U/L
LYMPHOCYTES # BLD AUTO: 3.2 K/UL — SIGNIFICANT CHANGE UP (ref 1–3.3)
LYMPHOCYTES # BLD AUTO: 37 % — SIGNIFICANT CHANGE UP (ref 13–44)
MCHC RBC-ENTMCNC: 25.5 PG — LOW (ref 27–34)
MCHC RBC-ENTMCNC: 33.7 G/DL — SIGNIFICANT CHANGE UP (ref 32–36)
MCV RBC AUTO: 75.6 FL — LOW (ref 80–100)
MONOCYTES # BLD AUTO: 0.7 K/UL — SIGNIFICANT CHANGE UP (ref 0–0.9)
MONOCYTES NFR BLD AUTO: 8.6 % — SIGNIFICANT CHANGE UP (ref 2–14)
NEUTROPHILS # BLD AUTO: 4.5 K/UL — SIGNIFICANT CHANGE UP (ref 1.8–7.4)
NEUTROPHILS NFR BLD AUTO: 51.5 % — SIGNIFICANT CHANGE UP (ref 43–77)
PLATELET # BLD AUTO: 215 K/UL — SIGNIFICANT CHANGE UP (ref 150–400)
POTASSIUM SERPL-SCNC: 4.2 MMOL/L
PROT SERPL-MCNC: 7 G/DL
RBC # BLD: 4.44 M/UL — SIGNIFICANT CHANGE UP (ref 3.8–5.2)
RBC # FLD: 16 % — HIGH (ref 10.3–14.5)
SODIUM SERPL-SCNC: 141 MMOL/L
WBC # BLD: 8.7 K/UL — SIGNIFICANT CHANGE UP (ref 3.8–10.5)
WBC # FLD AUTO: 8.7 K/UL — SIGNIFICANT CHANGE UP (ref 3.8–10.5)

## 2018-05-07 PROCEDURE — 99214 OFFICE O/P EST MOD 30 MIN: CPT

## 2018-05-25 ENCOUNTER — APPOINTMENT (OUTPATIENT)
Dept: ENDOCRINOLOGY | Facility: CLINIC | Age: 63
End: 2018-05-25
Payer: COMMERCIAL

## 2018-05-25 VITALS — BODY MASS INDEX: 38.33 KG/M2 | HEIGHT: 61 IN | HEART RATE: 94 BPM | WEIGHT: 203 LBS | OXYGEN SATURATION: 97 %

## 2018-05-25 VITALS — DIASTOLIC BLOOD PRESSURE: 80 MMHG | SYSTOLIC BLOOD PRESSURE: 128 MMHG

## 2018-05-25 LAB
GLUCOSE BLDC GLUCOMTR-MCNC: 132
HBA1C MFR BLD HPLC: 7.6

## 2018-05-25 PROCEDURE — 82962 GLUCOSE BLOOD TEST: CPT

## 2018-05-25 PROCEDURE — 99214 OFFICE O/P EST MOD 30 MIN: CPT | Mod: 25

## 2018-05-25 PROCEDURE — 83036 HEMOGLOBIN GLYCOSYLATED A1C: CPT | Mod: QW

## 2018-06-05 ENCOUNTER — RX RENEWAL (OUTPATIENT)
Age: 63
End: 2018-06-05

## 2018-06-05 LAB
25(OH)D3 SERPL-MCNC: 18.3 NG/ML
ALBUMIN SERPL ELPH-MCNC: 4.6 G/DL
ALP BLD-CCNC: 104 U/L
ALT SERPL-CCNC: 35 U/L
ANION GAP SERPL CALC-SCNC: 17 MMOL/L
AST SERPL-CCNC: 32 U/L
BILIRUB SERPL-MCNC: <0.2 MG/DL
BUN SERPL-MCNC: 18 MG/DL
CALCIUM SERPL-MCNC: 11.3 MG/DL
CALCIUM SERPL-MCNC: 11.3 MG/DL
CHLORIDE SERPL-SCNC: 100 MMOL/L
CO2 SERPL-SCNC: 23 MMOL/L
CREAT SERPL-MCNC: 0.72 MG/DL
CREAT SPEC-SCNC: 180 MG/DL
GLUCOSE SERPL-MCNC: 129 MG/DL
MICROALBUMIN 24H UR DL<=1MG/L-MCNC: 3 MG/DL
MICROALBUMIN/CREAT 24H UR-RTO: 17 MG/G
PARATHYROID HORMONE INTACT: 68 PG/ML
PHOSPHATE SERPL-MCNC: 3.1 MG/DL
POTASSIUM SERPL-SCNC: 4.1 MMOL/L
PROT SERPL-MCNC: 7.4 G/DL
SODIUM SERPL-SCNC: 140 MMOL/L
T4 FREE SERPL-MCNC: 1.4 NG/DL
TSH SERPL-ACNC: 1.26 UIU/ML

## 2018-06-05 RX ORDER — CHOLECALCIFEROL (VITAMIN D3) 1250 MCG
1.25 MG CAPSULE ORAL
Qty: 6 | Refills: 0 | Status: ACTIVE | COMMUNITY
Start: 2018-06-05 | End: 1900-01-01

## 2018-06-07 LAB
CAU: 5 MG/DL
CREAT 24H UR-MCNC: 1.2 G/24 H
CREAT 24H UR-MCNC: 1.2 G/24 H
CREAT ?TM UR-MCNC: 74 MG/DL
CREAT ?TM UR-MCNC: 75 MG/DL
PROT ?TM UR-MCNC: 24 HR
PROT ?TM UR-MCNC: 24 HR
SPECIMEN VOL 24H UR: 1575 ML
SPECIMEN VOL 24H UR: 1575 ML
SPECIMEN VOL 24H UR: 79 MG/24 H

## 2018-06-20 ENCOUNTER — MEDICATION RENEWAL (OUTPATIENT)
Age: 63
End: 2018-06-20

## 2018-08-14 ENCOUNTER — APPOINTMENT (OUTPATIENT)
Dept: SURGICAL ONCOLOGY | Facility: CLINIC | Age: 63
End: 2018-08-14

## 2018-09-24 ENCOUNTER — MEDICATION RENEWAL (OUTPATIENT)
Age: 63
End: 2018-09-24

## 2018-09-25 ENCOUNTER — APPOINTMENT (OUTPATIENT)
Dept: ENDOCRINOLOGY | Facility: CLINIC | Age: 63
End: 2018-09-25

## 2018-10-09 ENCOUNTER — APPOINTMENT (OUTPATIENT)
Dept: SURGICAL ONCOLOGY | Facility: CLINIC | Age: 63
End: 2018-10-09
Payer: COMMERCIAL

## 2018-10-09 VITALS
HEIGHT: 61 IN | WEIGHT: 196 LBS | SYSTOLIC BLOOD PRESSURE: 142 MMHG | HEART RATE: 89 BPM | BODY MASS INDEX: 37 KG/M2 | RESPIRATION RATE: 15 BRPM | DIASTOLIC BLOOD PRESSURE: 80 MMHG

## 2018-10-09 PROCEDURE — 99213 OFFICE O/P EST LOW 20 MIN: CPT

## 2018-10-29 ENCOUNTER — OUTPATIENT (OUTPATIENT)
Dept: OUTPATIENT SERVICES | Facility: HOSPITAL | Age: 63
LOS: 1 days | Discharge: HOME | End: 2018-10-29

## 2018-10-29 DIAGNOSIS — Z98.89 OTHER SPECIFIED POSTPROCEDURAL STATES: Chronic | ICD-10-CM

## 2018-10-29 DIAGNOSIS — D05.10 INTRADUCTAL CARCINOMA IN SITU OF UNSPECIFIED BREAST: ICD-10-CM

## 2018-10-29 DIAGNOSIS — Z90.710 ACQUIRED ABSENCE OF BOTH CERVIX AND UTERUS: Chronic | ICD-10-CM

## 2018-11-06 ENCOUNTER — RESULT REVIEW (OUTPATIENT)
Age: 63
End: 2018-11-06

## 2018-11-06 ENCOUNTER — APPOINTMENT (OUTPATIENT)
Dept: HEMATOLOGY ONCOLOGY | Facility: CLINIC | Age: 63
End: 2018-11-06
Payer: COMMERCIAL

## 2018-11-06 VITALS
WEIGHT: 201.72 LBS | TEMPERATURE: 98 F | SYSTOLIC BLOOD PRESSURE: 130 MMHG | BODY MASS INDEX: 38.11 KG/M2 | OXYGEN SATURATION: 99 % | RESPIRATION RATE: 16 BRPM | HEART RATE: 94 BPM | DIASTOLIC BLOOD PRESSURE: 70 MMHG

## 2018-11-06 LAB
BASOPHILS # BLD AUTO: 0 K/UL — SIGNIFICANT CHANGE UP (ref 0–0.2)
BASOPHILS NFR BLD AUTO: 0.4 % — SIGNIFICANT CHANGE UP (ref 0–2)
EOSINOPHIL # BLD AUTO: 0.3 K/UL — SIGNIFICANT CHANGE UP (ref 0–0.5)
EOSINOPHIL NFR BLD AUTO: 2.8 % — SIGNIFICANT CHANGE UP (ref 0–6)
HCT VFR BLD CALC: 36.8 % — SIGNIFICANT CHANGE UP (ref 34.5–45)
HGB BLD-MCNC: 12.4 G/DL — SIGNIFICANT CHANGE UP (ref 11.5–15.5)
LYMPHOCYTES # BLD AUTO: 3.1 K/UL — SIGNIFICANT CHANGE UP (ref 1–3.3)
LYMPHOCYTES # BLD AUTO: 34 % — SIGNIFICANT CHANGE UP (ref 13–44)
MCHC RBC-ENTMCNC: 26.5 PG — LOW (ref 27–34)
MCHC RBC-ENTMCNC: 33.7 G/DL — SIGNIFICANT CHANGE UP (ref 32–36)
MCV RBC AUTO: 78.5 FL — LOW (ref 80–100)
MONOCYTES # BLD AUTO: 0.7 K/UL — SIGNIFICANT CHANGE UP (ref 0–0.9)
MONOCYTES NFR BLD AUTO: 7.8 % — SIGNIFICANT CHANGE UP (ref 2–14)
NEUTROPHILS # BLD AUTO: 5 K/UL — SIGNIFICANT CHANGE UP (ref 1.8–7.4)
NEUTROPHILS NFR BLD AUTO: 55.1 % — SIGNIFICANT CHANGE UP (ref 43–77)
PLATELET # BLD AUTO: 235 K/UL — SIGNIFICANT CHANGE UP (ref 150–400)
RBC # BLD: 4.69 M/UL — SIGNIFICANT CHANGE UP (ref 3.8–5.2)
RBC # FLD: 17.2 % — HIGH (ref 10.3–14.5)
WBC # BLD: 9.2 K/UL — SIGNIFICANT CHANGE UP (ref 3.8–10.5)
WBC # FLD AUTO: 9.2 K/UL — SIGNIFICANT CHANGE UP (ref 3.8–10.5)

## 2018-11-06 PROCEDURE — 99215 OFFICE O/P EST HI 40 MIN: CPT

## 2018-11-06 NOTE — ASSESSMENT
[FreeTextEntry1] : THA-told to continue ferrous sulfate daily, FU with PMD one month\par \par DCIS- continue anastrozole\par \par Stresses-denies depression talks to  support offered

## 2018-11-06 NOTE — HISTORY OF PRESENT ILLNESS
[de-identified] : had mammogram normal\par continues anastrozole\par Saw GI and had upper endoscopy, colonoscopy\par Told polyps\par had H. Pylori put on antibiotics\par \par Stress from stepfather in house roaming around\par taking care of grandson with severe special needs

## 2018-11-07 LAB
ALBUMIN SERPL ELPH-MCNC: 4.1 G/DL
ALP BLD-CCNC: 98 U/L
ALT SERPL-CCNC: 31 U/L
ANION GAP SERPL CALC-SCNC: 17 MMOL/L
AST SERPL-CCNC: 22 U/L
BILIRUB SERPL-MCNC: <0.2 MG/DL
BUN SERPL-MCNC: 14 MG/DL
CALCIUM SERPL-MCNC: 11.1 MG/DL
CANCER AG27-29 SERPL-ACNC: 9.5 U/ML
CHLORIDE SERPL-SCNC: 98 MMOL/L
CO2 SERPL-SCNC: 26 MMOL/L
CREAT SERPL-MCNC: 0.93 MG/DL
FERRITIN SERPL-MCNC: 19 NG/ML
GLUCOSE SERPL-MCNC: 148 MG/DL
IRON SATN MFR SERPL: 8 %
IRON SERPL-MCNC: 31 UG/DL
POTASSIUM SERPL-SCNC: 4.3 MMOL/L
PROT SERPL-MCNC: 6.9 G/DL
SODIUM SERPL-SCNC: 141 MMOL/L
TIBC SERPL-MCNC: 374 UG/DL
UIBC SERPL-MCNC: 343 UG/DL

## 2018-11-27 ENCOUNTER — APPOINTMENT (OUTPATIENT)
Dept: ENDOCRINOLOGY | Facility: CLINIC | Age: 63
End: 2018-11-27
Payer: COMMERCIAL

## 2018-11-27 VITALS
SYSTOLIC BLOOD PRESSURE: 151 MMHG | OXYGEN SATURATION: 98 % | BODY MASS INDEX: 37.76 KG/M2 | DIASTOLIC BLOOD PRESSURE: 91 MMHG | HEART RATE: 87 BPM | RESPIRATION RATE: 16 BRPM | TEMPERATURE: 98.9 F | WEIGHT: 200 LBS | HEIGHT: 61 IN

## 2018-11-27 PROCEDURE — 99214 OFFICE O/P EST MOD 30 MIN: CPT

## 2018-11-27 RX ORDER — LOSARTAN POTASSIUM 100 MG/1
100 TABLET, FILM COATED ORAL DAILY
Qty: 30 | Refills: 5 | Status: ACTIVE | COMMUNITY
Start: 2018-11-27

## 2018-12-04 LAB
25(OH)D3 SERPL-MCNC: 19.4 NG/ML
ALBUMIN SERPL ELPH-MCNC: 4.5 G/DL
ALP BLD-CCNC: 94 U/L
ALT SERPL-CCNC: 32 U/L
ANION GAP SERPL CALC-SCNC: 13 MMOL/L
AST SERPL-CCNC: 26 U/L
BILIRUB SERPL-MCNC: <0.2 MG/DL
BUN SERPL-MCNC: 14 MG/DL
CALCIUM SERPL-MCNC: 11.1 MG/DL
CALCIUM SERPL-MCNC: 11.1 MG/DL
CHLORIDE SERPL-SCNC: 102 MMOL/L
CHOLEST SERPL-MCNC: 161 MG/DL
CHOLEST/HDLC SERPL: 3.8 RATIO
CO2 SERPL-SCNC: 27 MMOL/L
CREAT SERPL-MCNC: 0.68 MG/DL
CREAT SPEC-SCNC: 112 MG/DL
GLUCOSE SERPL-MCNC: 110 MG/DL
HBA1C MFR BLD HPLC: 7.7 %
HDLC SERPL-MCNC: 42 MG/DL
LDLC SERPL CALC-MCNC: 54 MG/DL
MICROALBUMIN 24H UR DL<=1MG/L-MCNC: 2.3 MG/DL
MICROALBUMIN/CREAT 24H UR-RTO: 21 MG/G
PARATHYROID HORMONE INTACT: 76 PG/ML
PHOSPHATE SERPL-MCNC: 2.9 MG/DL
POTASSIUM SERPL-SCNC: 3.9 MMOL/L
PROT SERPL-MCNC: 7.6 G/DL
SODIUM SERPL-SCNC: 142 MMOL/L
TRIGL SERPL-MCNC: 323 MG/DL

## 2019-02-13 ENCOUNTER — OUTPATIENT (OUTPATIENT)
Dept: OUTPATIENT SERVICES | Facility: HOSPITAL | Age: 64
LOS: 1 days | Discharge: HOME | End: 2019-02-13

## 2019-02-13 DIAGNOSIS — Z98.89 OTHER SPECIFIED POSTPROCEDURAL STATES: Chronic | ICD-10-CM

## 2019-02-13 DIAGNOSIS — D05.10 INTRADUCTAL CARCINOMA IN SITU OF UNSPECIFIED BREAST: ICD-10-CM

## 2019-02-13 DIAGNOSIS — Z90.710 ACQUIRED ABSENCE OF BOTH CERVIX AND UTERUS: Chronic | ICD-10-CM

## 2019-02-26 ENCOUNTER — RESULT REVIEW (OUTPATIENT)
Age: 64
End: 2019-02-26

## 2019-02-26 ENCOUNTER — APPOINTMENT (OUTPATIENT)
Dept: HEMATOLOGY ONCOLOGY | Facility: CLINIC | Age: 64
End: 2019-02-26
Payer: COMMERCIAL

## 2019-02-26 VITALS
RESPIRATION RATE: 20 BRPM | HEART RATE: 77 BPM | SYSTOLIC BLOOD PRESSURE: 147 MMHG | BODY MASS INDEX: 37.07 KG/M2 | OXYGEN SATURATION: 97 % | DIASTOLIC BLOOD PRESSURE: 68 MMHG | TEMPERATURE: 97.9 F | WEIGHT: 196.21 LBS

## 2019-02-26 LAB
BASOPHILS # BLD AUTO: 0.1 K/UL — SIGNIFICANT CHANGE UP (ref 0–0.2)
BASOPHILS NFR BLD AUTO: 1.1 % — SIGNIFICANT CHANGE UP (ref 0–2)
EOSINOPHIL # BLD AUTO: 0.3 K/UL — SIGNIFICANT CHANGE UP (ref 0–0.5)
EOSINOPHIL NFR BLD AUTO: 3.4 % — SIGNIFICANT CHANGE UP (ref 0–6)
HCT VFR BLD CALC: 39.6 % — SIGNIFICANT CHANGE UP (ref 34.5–45)
HGB BLD-MCNC: 12.8 G/DL — SIGNIFICANT CHANGE UP (ref 11.5–15.5)
LYMPHOCYTES # BLD AUTO: 3.3 K/UL — SIGNIFICANT CHANGE UP (ref 1–3.3)
LYMPHOCYTES # BLD AUTO: 36.3 % — SIGNIFICANT CHANGE UP (ref 13–44)
MCHC RBC-ENTMCNC: 26.5 PG — LOW (ref 27–34)
MCHC RBC-ENTMCNC: 32.3 G/DL — SIGNIFICANT CHANGE UP (ref 32–36)
MCV RBC AUTO: 81.9 FL — SIGNIFICANT CHANGE UP (ref 80–100)
MONOCYTES # BLD AUTO: 0.8 K/UL — SIGNIFICANT CHANGE UP (ref 0–0.9)
MONOCYTES NFR BLD AUTO: 8.8 % — SIGNIFICANT CHANGE UP (ref 2–14)
NEUTROPHILS # BLD AUTO: 4.6 K/UL — SIGNIFICANT CHANGE UP (ref 1.8–7.4)
NEUTROPHILS NFR BLD AUTO: 50.4 % — SIGNIFICANT CHANGE UP (ref 43–77)
PLATELET # BLD AUTO: 232 K/UL — SIGNIFICANT CHANGE UP (ref 150–400)
RBC # BLD: 4.83 M/UL — SIGNIFICANT CHANGE UP (ref 3.8–5.2)
RBC # FLD: 14.9 % — HIGH (ref 10.3–14.5)
WBC # BLD: 9 K/UL — SIGNIFICANT CHANGE UP (ref 3.8–10.5)
WBC # FLD AUTO: 9 K/UL — SIGNIFICANT CHANGE UP (ref 3.8–10.5)

## 2019-02-26 PROCEDURE — 99215 OFFICE O/P EST HI 40 MIN: CPT

## 2019-02-26 NOTE — ASSESSMENT
[FreeTextEntry1] : Discussed with patient that she has to draw limit on caregiving\par Should delegate 's sister's care to her own family and tell her  that is her stress level does not improve she could have a breakdown\par \par \par RTO 6 months

## 2019-02-26 NOTE — HISTORY OF PRESENT ILLNESS
[de-identified] : Has occasional night sweats\par \par Majority of visit was counselling regarding stress\par She has multiple stressors: stepfather with cognitive impairment,living with them, grandson with special needs, she is primary caregiver for 's sister even though this sister has a family of her own\par has caregiver mentality/instinct since mother  when she was very young\par Doesn't go out 
None known

## 2019-02-27 LAB
ALBUMIN SERPL ELPH-MCNC: 4.5 G/DL
ALP BLD-CCNC: 99 U/L
ALT SERPL-CCNC: 28 U/L
ANION GAP SERPL CALC-SCNC: 13 MMOL/L
AST SERPL-CCNC: 22 U/L
BILIRUB SERPL-MCNC: <0.2 MG/DL
BUN SERPL-MCNC: 18 MG/DL
CALCIUM SERPL-MCNC: 11.4 MG/DL
CHLORIDE SERPL-SCNC: 103 MMOL/L
CO2 SERPL-SCNC: 24 MMOL/L
CREAT SERPL-MCNC: 1.08 MG/DL
FERRITIN SERPL-MCNC: 35 NG/ML
GLUCOSE SERPL-MCNC: 126 MG/DL
IRON SATN MFR SERPL: 9 %
IRON SERPL-MCNC: 30 UG/DL
LDH SERPL-CCNC: 117 U/L
POTASSIUM SERPL-SCNC: 4.9 MMOL/L
PROT SERPL-MCNC: 7.2 G/DL
SODIUM SERPL-SCNC: 140 MMOL/L
TIBC SERPL-MCNC: 350 UG/DL
UIBC SERPL-MCNC: 320 UG/DL

## 2019-03-12 ENCOUNTER — APPOINTMENT (OUTPATIENT)
Dept: PULMONOLOGY | Facility: CLINIC | Age: 64
End: 2019-03-12
Payer: COMMERCIAL

## 2019-03-21 ENCOUNTER — APPOINTMENT (OUTPATIENT)
Dept: PULMONOLOGY | Facility: CLINIC | Age: 64
End: 2019-03-21
Payer: COMMERCIAL

## 2019-03-21 VITALS
WEIGHT: 191 LBS | HEART RATE: 73 BPM | DIASTOLIC BLOOD PRESSURE: 70 MMHG | RESPIRATION RATE: 14 BRPM | BODY MASS INDEX: 36.06 KG/M2 | SYSTOLIC BLOOD PRESSURE: 122 MMHG | OXYGEN SATURATION: 98 % | HEIGHT: 61 IN

## 2019-03-21 DIAGNOSIS — K21.9 GASTRO-ESOPHAGEAL REFLUX DISEASE W/OUT ESOPHAGITIS: ICD-10-CM

## 2019-03-21 PROCEDURE — 99214 OFFICE O/P EST MOD 30 MIN: CPT

## 2019-03-21 NOTE — HISTORY OF PRESENT ILLNESS
[FreeTextEntry1] : This is a 64 year old female with PMHx of GERD, HTN, psoriasis, DM type 2, HLD, gout and  DCIS of L breast s/p left mastectomy w LINDA in 2016. She is for routine f/u.\par \par The patient reports she is in her normal state of health. She reports having a lot of stress in the home and is trying to deal with it. The stress sets off some chest tightness and feelings of SOB. She states she has been sleeping better lately due to exhaustion. She is now on nitroglycerin sl tabs PRN for chest pains. She uses it max 2x a month.  \par \par She has updates to her health: \par -She was diagnosed with H.pylori, gastritis and polyps in stomach 6 months prior- she has finished 2 courses of antibiotics and is now awaiting re-test to see if it has resolved. She is no longer on zantac QHS even though she felt it was helping her. Her GI MD wants to pull back on reflux meds- she is on omeprazole 40 mg daily.\par -She was told she had elevated calcium levels and is following with endo for possible parathyroid issue. She was taken off chlorthalidone to see if calcium levels would normalize. She is now on Amlodipine for her BP.\par -She reported palpitations to her cardiologist, she had a holter test completed and she reports they will re-assess her down the road.\par -She may have a hernia and GI wants her to get evaluated by general surgery. \par \par She adds that she has something on her wrist that sometimes causes her pain. \par She states that her appetite is normal, weight has been stable and has actually lost some purposefully- denies fever, chills, wheezing, sore throat, cough, swallowing problems, post nasal drip, sinus pressure, itchy or watery eyes, ear pain, palpitations, dizziness, PND, orthopnea or lower extremity edema.\par \par She has not had a follow up CT scan.

## 2019-03-21 NOTE — ASSESSMENT
[FreeTextEntry1] : The plan for the patient is as follows:\par \par 1.Abnormal CT scan/pulmonary nodule\par -F/u CT chest in 1 year to monitor pulmonary nodule\par \par 2.snoring, hx of unrefreshing sleep and chronic fatigue\par -recommended HST- patient refused in past- aware of the consequences of untreated sleep apnea\par \par 3.GERD\par -continue PPI as per GI\par -off h2 blocker\par \par 4.Cardiac complaints\par -continued follow up with cardiology\par \par 5.Obesity\par -encouraged increasing activity as tolerated and routine exercise regimen\par -proper diet/well balanced diet\par \par 6.Wrist w ganglion cyst?\par -see derm for possible aspiration or excision if painful\par \par 7.Abdominal Hernia?\par -Dr. Wooten info given to patient\par \par 8.Elevated calcium level\par -seeing endo next week\par \par 1 year F/U\par will call w/CT results. \par pt to call if any issues.

## 2019-03-21 NOTE — REVIEW OF SYSTEMS
[Fever] : no fever [Chills] : no chills [Fatigue] : fatigue [Poor Appetite] : normal appetite  [Recent Wt Gain (___ Lbs)] : no recent weight gain [Dry Eyes] : no dryness of the eyes [Eye Irritation] : no ~T irritation of the eyes [Ear Disturbance] : no ear disturbance [Nasal Congestion] : no nasal congestion [Epistaxis] : no nosebleeds [Postnasal Drip] : no postnasal drip [Sinus Problems] : no sinus problems [Sore Throat] : no sore throat [Dry Mouth] : no dry mouth [Mouth Ulcers] : no mouth ulcers [Cough] : no cough [Sputum] : not coughing up ~M sputum [Hemoptysis] : no hemoptysis [Dyspnea] : no dyspnea [Chest Tightness] : chest tightness [Pleuritic Pain] : no pleuritic pain [Frequent URIs] : no frequent upper respiratory infections [Wheezing] : no wheezing [PND] : no PND [Orthopnea] : no orthopnea [Dysrhythmia] : no dysrhythmia [Murmurs] : no murmurs were heard [Palpitations] : palpitations [Edema] : ~T edema was not present [Claudication] : no intermittent claudication [Leg Cramps] : no leg cramps [Heartburn] : no heartburn [Reflux] : no reflux [Indigestion] : no indigestion [Dysphagia] : no dysphagia [Nausea] : no nausea [Vomiting] : no vomiting [Diarrhea] : no diarrhea [Abdominal Pain] : no abdominal pain [Headache] : no headache [Dizziness] : no dizziness [Syncope] : no fainting [As Noted in HPI] : as noted in HPI [Difficulty Initiating Sleep] : no difficulty falling asleep [Difficulty Maintaining Sleep] : no difficulty maintaining sleep [Dysesthesias] : no dysesthesias [Paresthesias] : no paresthesias [Unusual Sleep Behavior] : no unusual sleep behavior [Unusual Movements] : no involuntary movements during sleep [Snoring] : snoring [Witnessed Apneas] : demonstrated no ~M apnea [Nonrestorative Sleep] : restorative sleep [Awakes With Headache] : awakes without a headache [Awakes With Dry Mouth] : awakes without dry mouth [Hypersomnolence] : not sleeping much more than usual [Cataplexy] :  no cataplexy [Sleep Paralysis] : no sleep paralysis [Hypnogogic Hallucinations] : no hypnogogic hallucinations [Hypnopompic Hallucinations] : no hypnopompic hallucinations [Negative] : Sleep Disorder

## 2019-03-21 NOTE — REASON FOR VISIT
[Follow-Up] : a follow-up visit [Abnormal CT Scan] : abnormal CT Scan [FreeTextEntry2] : pulmonary nodule hx

## 2019-03-21 NOTE — PHYSICAL EXAM
[General Appearance - Well Developed] : well developed [Normal Appearance] : normal appearance [Well Groomed] : well groomed [General Appearance - Well Nourished] : well nourished [No Deformities] : no deformities [General Appearance - In No Acute Distress] : no acute distress [Normal Conjunctiva] : the conjunctiva exhibited no abnormalities [Eyelids - No Xanthelasma] : the eyelids demonstrated no xanthelasmas [Normal Oropharynx] : normal oropharynx [II] : II [Neck Appearance] : the appearance of the neck was normal [Neck Cervical Mass (___cm)] : no neck mass was observed [Jugular Venous Distention Increased] : there was no jugular-venous distention [Heart Rate And Rhythm] : heart rate and rhythm were normal [Heart Sounds] : normal S1 and S2 [Murmurs] : no murmurs present [Arterial Pulses Normal] : the arterial pulses were normal [Edema] : no peripheral edema present [Respiration, Rhythm And Depth] : normal respiratory rhythm and effort [Exaggerated Use Of Accessory Muscles For Inspiration] : no accessory muscle use [Auscultation Breath Sounds / Voice Sounds] : lungs were clear to auscultation bilaterally [Abdomen Soft] : soft [Abdomen Tenderness] : non-tender [Abdomen Mass (___ Cm)] : no abdominal mass palpated [Abnormal Walk] : normal gait [Gait - Sufficient For Exercise Testing] : the gait was sufficient for exercise testing [Nail Clubbing] : no clubbing of the fingernails [Cyanosis, Localized] : no localized cyanosis [Petechial Hemorrhages (___cm)] : no petechial hemorrhages [] : no ischemic changes [Sensation] : the sensory exam was normal to light touch and pinprick [Motor Exam] : the motor exam was normal [No Focal Deficits] : no focal deficits [Oriented To Time, Place, And Person] : oriented to person, place, and time [Impaired Insight] : insight and judgment were intact [Affect] : the affect was normal [Mood] : the mood was normal

## 2019-03-26 ENCOUNTER — APPOINTMENT (OUTPATIENT)
Dept: ENDOCRINOLOGY | Facility: CLINIC | Age: 64
End: 2019-03-26
Payer: COMMERCIAL

## 2019-03-26 VITALS
HEIGHT: 61 IN | BODY MASS INDEX: 36.06 KG/M2 | SYSTOLIC BLOOD PRESSURE: 153 MMHG | RESPIRATION RATE: 14 BRPM | DIASTOLIC BLOOD PRESSURE: 70 MMHG | TEMPERATURE: 98.3 F | WEIGHT: 191 LBS | HEART RATE: 64 BPM

## 2019-03-26 LAB
25(OH)D3 SERPL-MCNC: 21.6 NG/ML
ALBUMIN SERPL ELPH-MCNC: 4.5 G/DL
ALP BLD-CCNC: 83 U/L
ALT SERPL-CCNC: 20 U/L
ANION GAP SERPL CALC-SCNC: 14 MMOL/L
AST SERPL-CCNC: 20 U/L
BILIRUB SERPL-MCNC: 0.3 MG/DL
BUN SERPL-MCNC: 11 MG/DL
CALCIUM SERPL-MCNC: 11.6 MG/DL
CALCIUM SERPL-MCNC: 11.6 MG/DL
CHLORIDE SERPL-SCNC: 103 MMOL/L
CO2 SERPL-SCNC: 22 MMOL/L
CREAT SERPL-MCNC: 0.62 MG/DL
GLUCOSE SERPL-MCNC: 125 MG/DL
PARATHYROID HORMONE INTACT: 62 PG/ML
PHOSPHATE SERPL-MCNC: 3.9 MG/DL
POTASSIUM SERPL-SCNC: 4.8 MMOL/L
PROT SERPL-MCNC: 6.9 G/DL
SODIUM SERPL-SCNC: 139 MMOL/L

## 2019-03-26 PROCEDURE — 99214 OFFICE O/P EST MOD 30 MIN: CPT

## 2019-03-28 ENCOUNTER — OTHER (OUTPATIENT)
Age: 64
End: 2019-03-28

## 2019-03-29 ENCOUNTER — FORM ENCOUNTER (OUTPATIENT)
Age: 64
End: 2019-03-29

## 2019-03-29 LAB
ESTIMATED AVERAGE GLUCOSE: 148 MG/DL
HBA1C MFR BLD HPLC: 6.8 %

## 2019-03-30 ENCOUNTER — APPOINTMENT (OUTPATIENT)
Dept: MAMMOGRAPHY | Facility: IMAGING CENTER | Age: 64
End: 2019-03-30

## 2019-03-30 ENCOUNTER — OUTPATIENT (OUTPATIENT)
Dept: OUTPATIENT SERVICES | Facility: HOSPITAL | Age: 64
LOS: 1 days | End: 2019-03-30
Payer: COMMERCIAL

## 2019-03-30 DIAGNOSIS — Z98.89 OTHER SPECIFIED POSTPROCEDURAL STATES: Chronic | ICD-10-CM

## 2019-03-30 DIAGNOSIS — Z00.8 ENCOUNTER FOR OTHER GENERAL EXAMINATION: ICD-10-CM

## 2019-03-30 DIAGNOSIS — Z90.710 ACQUIRED ABSENCE OF BOTH CERVIX AND UTERUS: Chronic | ICD-10-CM

## 2019-03-30 PROCEDURE — G0279: CPT | Mod: 26

## 2019-03-30 PROCEDURE — 77065 DX MAMMO INCL CAD UNI: CPT

## 2019-03-30 PROCEDURE — 77065 DX MAMMO INCL CAD UNI: CPT | Mod: 26,RT

## 2019-03-30 PROCEDURE — G0279: CPT

## 2019-04-01 ENCOUNTER — FORM ENCOUNTER (OUTPATIENT)
Age: 64
End: 2019-04-01

## 2019-04-02 ENCOUNTER — OUTPATIENT (OUTPATIENT)
Dept: OUTPATIENT SERVICES | Facility: HOSPITAL | Age: 64
LOS: 1 days | End: 2019-04-02
Payer: COMMERCIAL

## 2019-04-02 ENCOUNTER — APPOINTMENT (OUTPATIENT)
Dept: CT IMAGING | Facility: IMAGING CENTER | Age: 64
End: 2019-04-02
Payer: MEDICAID

## 2019-04-02 DIAGNOSIS — Z98.89 OTHER SPECIFIED POSTPROCEDURAL STATES: Chronic | ICD-10-CM

## 2019-04-02 DIAGNOSIS — Z90.710 ACQUIRED ABSENCE OF BOTH CERVIX AND UTERUS: Chronic | ICD-10-CM

## 2019-04-02 DIAGNOSIS — R93.89 ABNORMAL FINDINGS ON DIAGNOSTIC IMAGING OF OTHER SPECIFIED BODY STRUCTURES: ICD-10-CM

## 2019-04-02 PROCEDURE — 71250 CT THORAX DX C-: CPT

## 2019-04-02 PROCEDURE — 71250 CT THORAX DX C-: CPT | Mod: 26

## 2019-04-09 ENCOUNTER — APPOINTMENT (OUTPATIENT)
Dept: SURGICAL ONCOLOGY | Facility: CLINIC | Age: 64
End: 2019-04-09
Payer: COMMERCIAL

## 2019-04-09 VITALS
BODY MASS INDEX: 34.93 KG/M2 | DIASTOLIC BLOOD PRESSURE: 88 MMHG | OXYGEN SATURATION: 95 % | WEIGHT: 185 LBS | TEMPERATURE: 97.4 F | HEART RATE: 75 BPM | SYSTOLIC BLOOD PRESSURE: 150 MMHG | HEIGHT: 61 IN | RESPIRATION RATE: 17 BRPM

## 2019-04-09 PROCEDURE — 99213 OFFICE O/P EST LOW 20 MIN: CPT

## 2019-04-17 NOTE — PHYSICAL EXAM
[FreeTextEntry1] : Left breast: acquired absence.  Well healed mastectomy and LINDA flap without nodularity or skin thickening.  No axillary adenopathy.\par \par Right breast:  exam within normal. [Normal] : supple, no neck mass and thyroid not enlarged [Normal Neck Lymph Nodes] : normal neck lymph nodes  [Normal Supraclavicular Lymph Nodes] : normal supraclavicular lymph nodes [Normal Groin Lymph Nodes] : normal groin lymph nodes [Normal Axillary Lymph Nodes] : normal axillary lymph nodes [Normal] : oriented to person, place and time, with appropriate affect

## 2019-04-17 NOTE — HISTORY OF PRESENT ILLNESS
[de-identified] : Clara is a 62 year old female presents for a breast cancer follow up visit. She is s/p left mastectomy, left axillary sentinel & non sentinel lymph node biopsy with LINDA reconstruction by Dr. Thomas in November 2016. FInal pathology revealed a 7 mm DCIS, cribriform, micropapillary type, intermediate grade, no necrosis, negative lymph nodes, ER/MT(+). Tumor staging pTis pN0. She is currently on Anastrozole under the supervision of Dr. Elkins, tolerating well. \par \par 02/13/2018:  Patient presents for 6 months follow up.  She continues on Anastrazole for which she is tolerating.  She has occasional hot flashes but denies joint aches.  She denies new nodularity in the reconstructed left breast and offers no symptoms on the right breast.  Patient last underwent right breast mammogram and breast ultrasound 09/15/2017 which revealed 4 subcentimeter circumscribed masses in the medial right breast without ultrasound correlate - BIRADS 3.  She is due for 6 months follow up right mammogram in 03/2018.\par \par 10/09/2018:  Patient's 6 months follow up right mammogram 03/2018 - BI-RADS 2.  Offers no new complaints.  Was treated recently for Helicobacter Pylori and UTI.\par \par 04/09/2019:  Presenting for 6 months follow up.  Patient feels well and offers no new complaints.  She denies right breast pain, mass or skin thickening.   She had annual right mammogram 03/30/3019 - BI-RADS 1.

## 2019-04-17 NOTE — ASSESSMENT
[FreeTextEntry1] : Doing well.\par LOR.\par \par Plan:  Follow up in one year with annual right mammogram.

## 2019-04-23 ENCOUNTER — RX RENEWAL (OUTPATIENT)
Age: 64
End: 2019-04-23

## 2019-05-21 ENCOUNTER — APPOINTMENT (OUTPATIENT)
Dept: SURGERY | Facility: CLINIC | Age: 64
End: 2019-05-21
Payer: COMMERCIAL

## 2019-05-21 VITALS — SYSTOLIC BLOOD PRESSURE: 163 MMHG | HEART RATE: 71 BPM | DIASTOLIC BLOOD PRESSURE: 90 MMHG

## 2019-05-21 PROCEDURE — 99204 OFFICE O/P NEW MOD 45 MIN: CPT

## 2019-05-21 NOTE — ASSESSMENT
[FreeTextEntry1] : lengthy discussion regarding options for management. in view of labs and symptoms have recommended minimally invasive parathyroidectomy with PTH assay. risks, benefits and alternatives discussed at length. wishes to proceed with surgery. to be scheduled at [facility]. will require preop 4D CT and thyroid sonogram\par

## 2019-05-21 NOTE — CONSULT LETTER
[Dear  ___] : Dear  [unfilled], [Consult Letter:] : I had the pleasure of evaluating your patient, [unfilled]. [Please see my note below.] : Please see my note below. [Consult Closing:] : Thank you very much for allowing me to participate in the care of this patient.  If you have any questions, please do not hesitate to contact me. [Sincerely,] : Sincerely, [FreeTextEntry2] : Dr. Anju Larson, Dr. Chad Mohan [FreeTextEntry3] : Anselmo Javed MD, FACS\par System Director, Endocrine Surgery\par [facility]\par  [DrJacquelin  ___] : Dr. FUNG

## 2019-05-21 NOTE — HISTORY OF PRESENT ILLNESS
[de-identified] : Pt c/o elevated calcium for 14 years.  notes  bone pain, fatigue, constipation.  denies dysphagia, hoarseness  or RT exposure\par Ca 11.6,  PTH 62, urinary calcium 79\par bone density:   osteopenia

## 2019-05-21 NOTE — PHYSICAL EXAM
[de-identified] : no palpable thyroid nodules [Laryngoscopy Performed] : laryngoscopy was performed, see procedure section for findings [Midline] : located in midline position [Normal] : orientation to person, place, and time: normal [de-identified] : indirect  laryngoscopy shows normal vocal cord mobility bilaterally with no lesions noted

## 2019-05-28 LAB
MISCELLANEOUS TEST: NORMAL
PROC NAME: NORMAL

## 2019-05-29 ENCOUNTER — APPOINTMENT (OUTPATIENT)
Dept: SURGERY | Facility: CLINIC | Age: 64
End: 2019-05-29
Payer: COMMERCIAL

## 2019-05-29 VITALS
OXYGEN SATURATION: 99 % | WEIGHT: 187 LBS | HEART RATE: 77 BPM | HEIGHT: 61 IN | TEMPERATURE: 98.1 F | BODY MASS INDEX: 35.3 KG/M2 | SYSTOLIC BLOOD PRESSURE: 147 MMHG | DIASTOLIC BLOOD PRESSURE: 85 MMHG | RESPIRATION RATE: 15 BRPM

## 2019-05-29 DIAGNOSIS — T81.30XD DISRUPTION OF WOUND, UNSPECIFIED, SUBSEQUENT ENCOUNTER: ICD-10-CM

## 2019-05-29 PROCEDURE — 99203 OFFICE O/P NEW LOW 30 MIN: CPT

## 2019-05-29 RX ORDER — AMLODIPINE BESYLATE 10 MG/1
10 TABLET ORAL
Refills: 0 | Status: DISCONTINUED | COMMUNITY
Start: 2019-03-26 | End: 2019-05-29

## 2019-05-29 RX ORDER — CLOTRIMAZOLE AND BETAMETHASONE DIPROPIONATE 10; .5 MG/G; MG/G
1-0.05 CREAM TOPICAL
Qty: 45 | Refills: 0 | Status: DISCONTINUED | COMMUNITY
Start: 2017-02-03 | End: 2019-05-29

## 2019-05-29 NOTE — PHYSICAL EXAM
[Normal Thyroid] : the thyroid was normal [Respiratory Effort] : normal respiratory effort [Normal Rate and Rhythm] : normal rate and rhythm [Abdominal Aorta] : Normal abdominal aorta [No Rash or Lesion] : No rash or lesion [Oriented to Person] : oriented to person [Oriented to Place] : oriented to place [Oriented to Time] : oriented to time [Calm] : calm [de-identified] : In no distress [de-identified] : Normocephalic, atraumatic [de-identified] : No palpable adenopathy [de-identified] : Obese, soft, nondistended, nontender. No palpable mass or organomegaly. Multiple well-healed surgical scars. Considerable redundant pannus, thicker and more prominent on the left than on the right but no palpable hernias or fascial defects. [de-identified] : Normal gait; no deformity [de-identified] : No gross sensory or motor to [de-identified] : Normal mood and affect

## 2019-05-29 NOTE — ASSESSMENT
[FreeTextEntry1] : 64-year-old female with asymmetrical redundant abdominal pannus secondary to previous TRAM flap reconstructive procedure but no palpable abdominal hernias.

## 2019-05-29 NOTE — CONSULT LETTER
[Dear  ___] : Dear  [unfilled], [Sincerely,] : Sincerely, [FreeTextEntry2] : Dr. Priscila Dumont [FreeTextEntry1] : At your recommendation I saw Clara Martinez in the office on May 29th for evaluation of a possible abdominal hernia. As you know, she has undergone multiple prior abdominal surgeries including open appendectomy and open cholecystectomy as well as open bilateral salpingo-oophorectomy and TRAM flap reconstruction following mastectomy. Apparently the TRAM procedure was complicated by a recurrent wound seroma and sometime ago Ms. Martinez noted a bulge in the area of the left lower quadrant. She denied pain or tenderness but did admit to a long history of erratic bowel movements.\par \par On exam, the patient was noted to be rather obese. The abdomen was soft, nondistended and nontender without palpable mass or organomegaly. Multiple surgical scars were present and a significant redundant abdominal pannus was noted which was significantly thicker and more pendulous on the left than on the right. However, no hernias or fascial defects were appreciated. The remainder of the exam was noncontributory.\par \par I reassured Ms. Martinez as to the absence of any palpable hernias and suggested that she consider reconsultation with her plastic surgeon regarding possible revision of the surgical scar. Thanks for allowing me to see this pleasant woman and I hope this letter finds you recuperated and feeling well. [FreeTextEntry3] : \par \par Zhang Acosta M.D., F.A.C.S.

## 2019-05-29 NOTE — REASON FOR VISIT
[Follow-Up] : a follow-up visit [FreeTextEntry1] : referred by Dr. Priscila Dumont for a possible Right inguinal hernia.

## 2019-05-29 NOTE — PLAN
[FreeTextEntry1] : Patient advised to continue observation and consider reconsultation with plastic surgery for scar revision.

## 2019-05-29 NOTE — HISTORY OF PRESENT ILLNESS
[de-identified] : Status post multiple prior abdominal surgeries including open cholecystectomy, open appendectomy, open BSO and TRAM flap reconstruction following mastectomy. For several years has been aware of a protrusion in the left lower quadrant which has not increased significantly over time. No pain or tenderness but does have a long history of erratic bowel movements.

## 2019-05-30 ENCOUNTER — FORM ENCOUNTER (OUTPATIENT)
Age: 64
End: 2019-05-30

## 2019-05-31 ENCOUNTER — APPOINTMENT (OUTPATIENT)
Dept: CT IMAGING | Facility: IMAGING CENTER | Age: 64
End: 2019-05-31
Payer: COMMERCIAL

## 2019-05-31 ENCOUNTER — APPOINTMENT (OUTPATIENT)
Dept: ULTRASOUND IMAGING | Facility: IMAGING CENTER | Age: 64
End: 2019-05-31
Payer: COMMERCIAL

## 2019-05-31 ENCOUNTER — OUTPATIENT (OUTPATIENT)
Dept: OUTPATIENT SERVICES | Facility: HOSPITAL | Age: 64
LOS: 1 days | End: 2019-05-31
Payer: COMMERCIAL

## 2019-05-31 DIAGNOSIS — Z98.89 OTHER SPECIFIED POSTPROCEDURAL STATES: Chronic | ICD-10-CM

## 2019-05-31 DIAGNOSIS — Z90.710 ACQUIRED ABSENCE OF BOTH CERVIX AND UTERUS: Chronic | ICD-10-CM

## 2019-05-31 DIAGNOSIS — E21.3 HYPERPARATHYROIDISM, UNSPECIFIED: ICD-10-CM

## 2019-05-31 PROCEDURE — 76536 US EXAM OF HEAD AND NECK: CPT

## 2019-05-31 PROCEDURE — 70492 CT SFT TSUE NCK W/O & W/DYE: CPT

## 2019-05-31 PROCEDURE — 82565 ASSAY OF CREATININE: CPT

## 2019-05-31 PROCEDURE — 76536 US EXAM OF HEAD AND NECK: CPT | Mod: 26

## 2019-05-31 PROCEDURE — 70491 CT SOFT TISSUE NECK W/DYE: CPT | Mod: 26

## 2019-06-10 ENCOUNTER — OUTPATIENT (OUTPATIENT)
Dept: OUTPATIENT SERVICES | Facility: HOSPITAL | Age: 64
LOS: 1 days | End: 2019-06-10

## 2019-06-10 VITALS
HEIGHT: 59 IN | TEMPERATURE: 97 F | HEART RATE: 70 BPM | OXYGEN SATURATION: 98 % | WEIGHT: 192.02 LBS | RESPIRATION RATE: 14 BRPM | SYSTOLIC BLOOD PRESSURE: 146 MMHG | DIASTOLIC BLOOD PRESSURE: 78 MMHG

## 2019-06-10 DIAGNOSIS — R91.1 SOLITARY PULMONARY NODULE: ICD-10-CM

## 2019-06-10 DIAGNOSIS — C50.919 MALIGNANT NEOPLASM OF UNSPECIFIED SITE OF UNSPECIFIED FEMALE BREAST: ICD-10-CM

## 2019-06-10 DIAGNOSIS — G47.33 OBSTRUCTIVE SLEEP APNEA (ADULT) (PEDIATRIC): ICD-10-CM

## 2019-06-10 DIAGNOSIS — Z98.89 OTHER SPECIFIED POSTPROCEDURAL STATES: Chronic | ICD-10-CM

## 2019-06-10 DIAGNOSIS — Z86.79 PERSONAL HISTORY OF OTHER DISEASES OF THE CIRCULATORY SYSTEM: ICD-10-CM

## 2019-06-10 DIAGNOSIS — E21.0 PRIMARY HYPERPARATHYROIDISM: ICD-10-CM

## 2019-06-10 DIAGNOSIS — Z90.12 ACQUIRED ABSENCE OF LEFT BREAST AND NIPPLE: Chronic | ICD-10-CM

## 2019-06-10 DIAGNOSIS — I10 ESSENTIAL (PRIMARY) HYPERTENSION: ICD-10-CM

## 2019-06-10 DIAGNOSIS — K21.9 GASTRO-ESOPHAGEAL REFLUX DISEASE WITHOUT ESOPHAGITIS: ICD-10-CM

## 2019-06-10 DIAGNOSIS — E11.9 TYPE 2 DIABETES MELLITUS WITHOUT COMPLICATIONS: ICD-10-CM

## 2019-06-10 DIAGNOSIS — Z90.710 ACQUIRED ABSENCE OF BOTH CERVIX AND UTERUS: Chronic | ICD-10-CM

## 2019-06-10 LAB
ANION GAP SERPL CALC-SCNC: 16 MMO/L — HIGH (ref 7–14)
BUN SERPL-MCNC: 13 MG/DL — SIGNIFICANT CHANGE UP (ref 7–23)
CALCIUM SERPL-MCNC: 12 MG/DL — HIGH (ref 8.4–10.5)
CHLORIDE SERPL-SCNC: 102 MMOL/L — SIGNIFICANT CHANGE UP (ref 98–107)
CO2 SERPL-SCNC: 21 MMOL/L — LOW (ref 22–31)
CREAT SERPL-MCNC: 0.57 MG/DL — SIGNIFICANT CHANGE UP (ref 0.5–1.3)
GLUCOSE SERPL-MCNC: 105 MG/DL — HIGH (ref 70–99)
HBA1C BLD-MCNC: 6.7 % — HIGH (ref 4–5.6)
HCT VFR BLD CALC: 40.6 % — SIGNIFICANT CHANGE UP (ref 34.5–45)
HGB BLD-MCNC: 12.8 G/DL — SIGNIFICANT CHANGE UP (ref 11.5–15.5)
MCHC RBC-ENTMCNC: 27 PG — SIGNIFICANT CHANGE UP (ref 27–34)
MCHC RBC-ENTMCNC: 31.5 % — LOW (ref 32–36)
MCV RBC AUTO: 85.7 FL — SIGNIFICANT CHANGE UP (ref 80–100)
NRBC # FLD: 0 K/UL — SIGNIFICANT CHANGE UP (ref 0–0)
PLATELET # BLD AUTO: 262 K/UL — SIGNIFICANT CHANGE UP (ref 150–400)
PMV BLD: 11 FL — SIGNIFICANT CHANGE UP (ref 7–13)
POTASSIUM SERPL-MCNC: 4.2 MMOL/L — SIGNIFICANT CHANGE UP (ref 3.5–5.3)
POTASSIUM SERPL-SCNC: 4.2 MMOL/L — SIGNIFICANT CHANGE UP (ref 3.5–5.3)
RBC # BLD: 4.74 M/UL — SIGNIFICANT CHANGE UP (ref 3.8–5.2)
RBC # FLD: 14.9 % — HIGH (ref 10.3–14.5)
SODIUM SERPL-SCNC: 139 MMOL/L — SIGNIFICANT CHANGE UP (ref 135–145)
WBC # BLD: 7.74 K/UL — SIGNIFICANT CHANGE UP (ref 3.8–10.5)
WBC # FLD AUTO: 7.74 K/UL — SIGNIFICANT CHANGE UP (ref 3.8–10.5)

## 2019-06-10 NOTE — H&P PST ADULT - NSICDXPROBLEM_GEN_ALL_CORE_FT
PROBLEM DIAGNOSES  Problem: History of angina  Assessment and Plan: Pt to Dr gaffney for pre op cardiac evaluation  Request recent st/ echo reports   Vss ; pt in nad ; pt denies cp , palpitations, sob    Problem: GERD (gastroesophageal reflux disease)  Assessment and Plan: Pt to take omeprazole dos with sip water    Problem: Breast cancer  Assessment and Plan: NO blood , bp, iv left arm     Problem: Hypertension  Assessment and Plan: Pt to take metoprolol, amlodipine and losartan dos     Problem: Diabetes mellitus  Assessment and Plan: BMP, HGBA1c   Pt to hold metformin 24 hours pre op   FS dos     Problem: Primary hyperparathyroidism  Assessment and Plan: Parathyroidectomy with Parathyroid Hormone Assay   Pre op instructions reviewed with pt ; including Hibiclens ; pt appears to have a good understanding of pre op instructions  Pt to Dr Moe for pre op medical evaluation  Pt to Dr Gaffney for pre op cardiac evaluation     Problem: Lung nodule  Assessment and Plan: Pt states being monitored last c/t 3/19 PROBLEM DIAGNOSES  Problem: Primary hyperparathyroidism  Assessment and Plan: Parathyroidectomy with Parathyroid Hormone Assay   Pre op instructions reviewed with pt ; including Hibiclens ; pt appears to have a good understanding of pre op instructions  Pt to Dr Gaffney for pre op cardiac evaluation     Problem: History of angina  Assessment and Plan: Pt to Dr Gaffney for pre op cardiac evaluation  Request recent st/ echo reports   Vss ; pt in nad ; pt denies cp , palpitations, sob  Pt on asa ; call to cardiologist message left with Cassia regarding pre op asa plan     Problem: GERD (gastroesophageal reflux disease)  Assessment and Plan: Pt to take omeprazole dos with sip water    Problem: Breast cancer  Assessment and Plan: NO blood , bp, iv left arm     Problem: Hypertension  Assessment and Plan: Pt to take metoprolol, amlodipine and losartan dos     Problem: Diabetes mellitus  Assessment and Plan: BMP, HGBA1c   Pt to hold metformin 24 hours pre op   FS dos     Problem: Lung nodule  Assessment and Plan: Pt states being monitored last c/t 3/19     Problem: JEANIE (obstructive sleep apnea)  Assessment and Plan: JEANIE precautions  OR booking notified via fax PROBLEM DIAGNOSES  Problem: Primary hyperparathyroidism  Assessment and Plan: Parathyroidectomy with Parathyroid Hormone Assay   Pre op instructions reviewed with pt ; including Hibiclens ; pt appears to have a good understanding of pre op instructions  Pt to Dr Gaffney for pre op cardiac evaluation     Problem: History of angina  Assessment and Plan: Pt to Dr Gaffney for pre op cardiac evaluation  Request recent st/ echo reports   Vss ; pt in nad ; pt denies cp , palpitations, sob  Pt on asa ; call to cardiologist message left with Cassia regarding pre op asa plan   add; as per Cassia pt may hold asa 5 days pre op s/w pt ; pt states last asa "6/14/19" as per Dr Gaffney     Problem: GERD (gastroesophageal reflux disease)  Assessment and Plan: Pt to take omeprazole dos with sip water    Problem: Breast cancer  Assessment and Plan: NO blood , bp, iv left arm     Problem: Hypertension  Assessment and Plan: Pt to take metoprolol, amlodipine and losartan dos     Problem: Diabetes mellitus  Assessment and Plan: BMP, HGBA1c   Pt to hold metformin 24 hours pre op   FS dos     Problem: Lung nodule  Assessment and Plan: Pt states being monitored last c/t 3/19     Problem: JEANIE (obstructive sleep apnea)  Assessment and Plan: JEANIE precautions  OR booking notified via fax

## 2019-06-10 NOTE — H&P PST ADULT - OPH GEN HX ROS MEA POS PC
pt reports h/o bleed left eye ; opthamology last seen 2 weeks ago " stable " pt to f/u 6 months ; Dr Nunes ; [facility]

## 2019-06-10 NOTE — H&P PST ADULT - LYMPHATIC
no cervical LAD posterior cervical R/anterior cervical R/anterior cervical L/supraclavicular R/supraclavicular L/posterior cervical L

## 2019-06-10 NOTE — H&P PST ADULT - NSICDXPASTSURGICALHX_GEN_ALL_CORE_FT
PAST SURGICAL HISTORY:  H/O total hysterectomy 2002    History of appendectomy     History of cholecystectomy     History of tonsillectomy     S/P left mastectomy Lymph Node Dissection ; Shelley flap . Pt reports Revision Flap 2 weeks post op secondary to bleeding

## 2019-06-10 NOTE — H&P PST ADULT - PRIMARY CARE PROVIDER
Dr Simpson cardiologist  [phone]      Dr Gonsales endocrinologist Dr Gaffney cardiologist  [phone]      Dr Gonsales endocrinologist

## 2019-06-10 NOTE — H&P PST ADULT - HISTORY OF PRESENT ILLNESS
Pt is a 64 y.o. female ; pt reports h/o hypercalcemia ; pt states first noted 14 years ago ; pt states monitored by pcp and endocrinologist; pt states it has recently increased ; pt referred to surgeon ; a c/t scan was done ; pt now presents for parathyroidectomy with parathyroid Hormone Assay

## 2019-06-10 NOTE — H&P PST ADULT - NSANTHOSAYNRD_GEN_A_CORE
No. JEANIE screening performed.  STOP BANG Legend: 0-2 = LOW Risk; 3-4 = INTERMEDIATE Risk; 5-8 = HIGH Risk

## 2019-06-10 NOTE — H&P PST ADULT - HEALTH CARE MAINTENANCE
Flu vaccine in 2015-denies Flu vaccine   On yearly physical  Last colonoscopy - 14227  On yearly Annual physical  Last colonoscopy - 2006 Last colonoscopy  2018 with endoscopy

## 2019-06-10 NOTE — H&P PST ADULT - MUSCULOSKELETAL
negative detailed exam no joint swelling/normal strength/no calf tenderness/no joint warmth/no joint erythema/ROM intact details…

## 2019-06-10 NOTE — H&P PST ADULT - ACTIVITY
walks 8 blocksx2 daily, home chores, takes care of a child with special needs, baby sitting  2-3 grand kids walks 3 blocks  3 days week , home chores, takes care of a child with special needs,

## 2019-06-10 NOTE — H&P PST ADULT - NSICDXPASTMEDICALHX_GEN_ALL_CORE_FT
PAST MEDICAL HISTORY:  Breast tumor malignant  left breast 08/2016    Diabetes type II    Gout     History of angina last > few months ago relieved by  NTG x 2    History of Helicobacter pylori infection tx 6/18 and 12/18 ; pt to f/u with GI " appt end 6/19"    Hypertension     Irritable bowel syndrome with diarrhea x 30 years    Reflux PAST MEDICAL HISTORY:  Breast tumor malignant  left breast 08/2016    Diabetes type II    Gout     History of angina last > few months ago relieved by  NTG x 2    History of Helicobacter pylori infection tx 6/18 and 12/18 ; pt to f/u with GI " appt end 6/19"    Hypertension     Irritable bowel syndrome with diarrhea x 30 years    Lung nodule noted 2016 ; pt f/u with  repeat c/t scan yearly last 3/19    Osteopenia     Psoriasis Bilateral Elbows ; back    Reflux

## 2019-06-14 PROBLEM — M85.80 OTHER SPECIFIED DISORDERS OF BONE DENSITY AND STRUCTURE, UNSPECIFIED SITE: Chronic | Status: ACTIVE | Noted: 2019-06-10

## 2019-06-14 PROBLEM — Z86.19 PERSONAL HISTORY OF OTHER INFECTIOUS AND PARASITIC DISEASES: Chronic | Status: ACTIVE | Noted: 2019-06-10

## 2019-06-14 PROBLEM — R91.1 SOLITARY PULMONARY NODULE: Chronic | Status: ACTIVE | Noted: 2019-06-10

## 2019-06-14 PROBLEM — L40.9 PSORIASIS, UNSPECIFIED: Chronic | Status: ACTIVE | Noted: 2019-06-10

## 2019-06-20 RX ORDER — ASPIRIN/CALCIUM CARB/MAGNESIUM 324 MG
1 TABLET ORAL
Qty: 0 | Refills: 0 | DISCHARGE

## 2019-06-23 ENCOUNTER — TRANSCRIPTION ENCOUNTER (OUTPATIENT)
Age: 64
End: 2019-06-23

## 2019-06-24 ENCOUNTER — OTHER (OUTPATIENT)
Age: 64
End: 2019-06-24

## 2019-06-24 ENCOUNTER — APPOINTMENT (OUTPATIENT)
Dept: SURGERY | Facility: HOSPITAL | Age: 64
End: 2019-06-24

## 2019-06-24 ENCOUNTER — RESULT REVIEW (OUTPATIENT)
Age: 64
End: 2019-06-24

## 2019-06-24 ENCOUNTER — OUTPATIENT (OUTPATIENT)
Dept: OUTPATIENT SERVICES | Facility: HOSPITAL | Age: 64
LOS: 1 days | Discharge: HOME | End: 2019-06-24
Payer: COMMERCIAL

## 2019-06-24 VITALS
RESPIRATION RATE: 18 BRPM | OXYGEN SATURATION: 96 % | TEMPERATURE: 98 F | SYSTOLIC BLOOD PRESSURE: 144 MMHG | DIASTOLIC BLOOD PRESSURE: 77 MMHG | HEART RATE: 91 BPM

## 2019-06-24 VITALS
WEIGHT: 192.02 LBS | TEMPERATURE: 98 F | OXYGEN SATURATION: 98 % | SYSTOLIC BLOOD PRESSURE: 117 MMHG | DIASTOLIC BLOOD PRESSURE: 66 MMHG | HEIGHT: 59 IN | RESPIRATION RATE: 16 BRPM | HEART RATE: 82 BPM

## 2019-06-24 DIAGNOSIS — Z90.12 ACQUIRED ABSENCE OF LEFT BREAST AND NIPPLE: Chronic | ICD-10-CM

## 2019-06-24 DIAGNOSIS — Z98.89 OTHER SPECIFIED POSTPROCEDURAL STATES: Chronic | ICD-10-CM

## 2019-06-24 DIAGNOSIS — E21.0 PRIMARY HYPERPARATHYROIDISM: ICD-10-CM

## 2019-06-24 DIAGNOSIS — Z90.710 ACQUIRED ABSENCE OF BOTH CERVIX AND UTERUS: Chronic | ICD-10-CM

## 2019-06-24 LAB — GLUCOSE BLDC GLUCOMTR-MCNC: 127 MG/DL — HIGH (ref 70–99)

## 2019-06-24 PROCEDURE — 60500 EXPLORE PARATHYROID GLANDS: CPT

## 2019-06-24 PROCEDURE — 88305 TISSUE EXAM BY PATHOLOGIST: CPT | Mod: 26

## 2019-06-24 PROCEDURE — 60500 EXPLORE PARATHYROID GLANDS: CPT | Mod: AS

## 2019-06-24 PROCEDURE — 88331 PATH CONSLTJ SURG 1 BLK 1SPC: CPT | Mod: 26

## 2019-06-24 RX ORDER — CALCIUM CARBONATE 500(1250)
1 TABLET ORAL
Qty: 0 | Refills: 0 | DISCHARGE
Start: 2019-06-24

## 2019-06-24 RX ORDER — ACETAMINOPHEN 500 MG
2 TABLET ORAL
Qty: 0 | Refills: 0 | DISCHARGE
Start: 2019-06-24

## 2019-06-24 RX ORDER — ACETAMINOPHEN 500 MG
650 TABLET ORAL EVERY 6 HOURS
Refills: 0 | Status: DISCONTINUED | OUTPATIENT
Start: 2019-06-24 | End: 2019-07-09

## 2019-06-24 RX ORDER — BENZOCAINE AND MENTHOL 5; 1 G/100ML; G/100ML
1 LIQUID ORAL
Refills: 0 | Status: DISCONTINUED | OUTPATIENT
Start: 2019-06-24 | End: 2019-07-09

## 2019-06-24 RX ORDER — CALCIUM CARBONATE 500(1250)
1 TABLET ORAL
Refills: 0 | Status: DISCONTINUED | OUTPATIENT
Start: 2019-06-24 | End: 2019-07-09

## 2019-06-24 RX ORDER — SODIUM CHLORIDE 9 MG/ML
1000 INJECTION, SOLUTION INTRAVENOUS
Refills: 0 | Status: DISCONTINUED | OUTPATIENT
Start: 2019-06-24 | End: 2019-07-09

## 2019-06-24 RX ADMIN — Medication 1 TABLET(S): at 18:38

## 2019-06-24 NOTE — ASU DISCHARGE PLAN (ADULT/PEDIATRIC) - CARE PROVIDER_API CALL
Anselmo Javed)  Plastic Surgery; Surgery  [address], [address]  [place] [address]  Phone: [phone]  Fax: [phone]  Follow Up Time:

## 2019-06-24 NOTE — ASU PATIENT PROFILE, ADULT - PSH
H/O total hysterectomy  2002  History of appendectomy    History of cholecystectomy    History of tonsillectomy    S/P left mastectomy  Lymph Node Dissection ; Shelley flap . Pt reports Revision Flap 2 weeks post op secondary to bleeding

## 2019-06-24 NOTE — ASU PATIENT PROFILE, ADULT - PMH
Breast tumor  malignant  left breast 08/2016  Diabetes  type II  Gout    History of angina  last > few months ago relieved by  NTG x 2  History of Helicobacter pylori infection  tx 6/18 and 12/18 ; pt to f/u with GI " appt end 6/19"  Hypertension    Irritable bowel syndrome with diarrhea  x 30 years  Lung nodule  noted 2016 ; pt f/u with  repeat c/t scan yearly last 3/19  Osteopenia    Psoriasis  Bilateral Elbows ; back  Reflux

## 2019-06-27 LAB — SURGICAL PATHOLOGY STUDY: SIGNIFICANT CHANGE UP

## 2019-07-02 ENCOUNTER — APPOINTMENT (OUTPATIENT)
Dept: SURGERY | Facility: CLINIC | Age: 64
End: 2019-07-02
Payer: COMMERCIAL

## 2019-07-02 PROCEDURE — 99024 POSTOP FOLLOW-UP VISIT: CPT

## 2019-07-02 PROCEDURE — 36415 COLL VENOUS BLD VENIPUNCTURE: CPT

## 2019-07-02 NOTE — ASSESSMENT
[FreeTextEntry1] : s/p Parathyroidectomy\par daily care\par path reviewed\par blood work today\par f/u 3 months

## 2019-07-02 NOTE — PHYSICAL EXAM
[de-identified] : well healed scar [Midline] : located in midline position [Normal] : orientation to person, place, and time: normal [de-identified] : neg Chvostek's sign

## 2019-07-03 ENCOUNTER — RESULT REVIEW (OUTPATIENT)
Age: 64
End: 2019-07-03

## 2019-07-03 LAB
25(OH)D3 SERPL-MCNC: 15.8 NG/ML
CALCIUM SERPL-MCNC: 9.8 MG/DL
CALCIUM SERPL-MCNC: 9.8 MG/DL
PARATHYROID HORMONE INTACT: 39 PG/ML

## 2019-08-22 ENCOUNTER — OUTPATIENT (OUTPATIENT)
Dept: OUTPATIENT SERVICES | Facility: HOSPITAL | Age: 64
LOS: 1 days | Discharge: HOME | End: 2019-08-22

## 2019-08-22 DIAGNOSIS — D05.10 INTRADUCTAL CARCINOMA IN SITU OF UNSPECIFIED BREAST: ICD-10-CM

## 2019-08-22 DIAGNOSIS — Z98.89 OTHER SPECIFIED POSTPROCEDURAL STATES: Chronic | ICD-10-CM

## 2019-08-22 DIAGNOSIS — Z90.710 ACQUIRED ABSENCE OF BOTH CERVIX AND UTERUS: Chronic | ICD-10-CM

## 2019-08-22 DIAGNOSIS — Z90.12 ACQUIRED ABSENCE OF LEFT BREAST AND NIPPLE: Chronic | ICD-10-CM

## 2019-08-26 ENCOUNTER — APPOINTMENT (OUTPATIENT)
Dept: HEMATOLOGY ONCOLOGY | Facility: CLINIC | Age: 64
End: 2019-08-26
Payer: COMMERCIAL

## 2019-08-26 VITALS
OXYGEN SATURATION: 100 % | DIASTOLIC BLOOD PRESSURE: 85 MMHG | RESPIRATION RATE: 16 BRPM | BODY MASS INDEX: 35.07 KG/M2 | WEIGHT: 185.63 LBS | TEMPERATURE: 98.2 F | SYSTOLIC BLOOD PRESSURE: 138 MMHG | HEART RATE: 65 BPM

## 2019-08-26 PROCEDURE — 99215 OFFICE O/P EST HI 40 MIN: CPT

## 2019-08-26 NOTE — CONSULT LETTER
[Dear  ___] : Dear  [unfilled], [( Thank you for referring [unfilled] for consultation for _____ )] : Thank you for referring [unfilled] for consultation for [unfilled] [Please see my note below.] : Please see my note below. [Consult Closing:] : Thank you very much for allowing me to participate in the care of this patient.  If you have any questions, please do not hesitate to contact me. [Sincerely,] : Sincerely, [FreeTextEntry2] : Cirilo Foy M.D. [FreeTextEntry1] : I have recommended anastrozole to reduce the risk of another event in the right breast. [FreeTextEntry3] : Anuj Elkins M.D.

## 2019-08-26 NOTE — PHYSICAL EXAM
[Normal] : affect appropriate [Fully active, able to carry on all pre-disease performance without restriction] : Status 0 - Fully active, able to carry on all pre-disease performance without restriction [de-identified] : left mastectomy with reconstruction. No chest wall or axillary nodules. Right breast exam normal  [de-identified] : scar from appendectomy and LINDA flap- healed well [de-identified] : psoriasis patches on BL elbow

## 2019-08-26 NOTE — HISTORY OF PRESENT ILLNESS
[de-identified] : Ms. BRENDAN CRAVEN  is here for a follow up appt for left breast high grade DCIS (ER 90, ID 10) in 7/ 2016 and is on treatment with Anastrozole since 12/2016. \par Takes Anastrozole daily, good compliance. She has mild hot flashes. She has shoulder tendonitis and takes tylenol PRN. Pain hasn’t gotten worse with anastrozole. She denies vag dryness, excessive fatigue, GI s/e, hair loss. She is active, no change in energy, wt or appetite. \par mammogram 3/2019 -   BIRDAS 1\par DEXA 4/2019 - osteopenia:  She takes vit D. She is f/b endo for hypercal. She recently had parathyroid surgery.\par She was recently seen in ER for chest pain. CT C/A/P was negative for acute issues or malignancy. She is f/b cardio. Sx subsided\par FIRST VISIT WITH ME, TX CARE FROM dr hi\par  [de-identified] : Ms. Soto is being referred by Dr. Cirilo Foy for evaluation  for any additional therapy after her left mastectomy for DCIS.\par She was in her usual state of health until she had a screening mammography on . Calcifications were noted in the left central posterior breast she was brought back for a digital mammogram which revealed calcifications in the central posterior left breast  which was new from prior examination.  Stereotactic biopsy revealed ductal carcinoma in situ, solid pattern with high nuclear grade. There were microcalcifications present in the DCIS as well as benign tissue estrogen receptor was seen in 95% of nuclei and progesterone receptor and 10%.\par Bilateral breast MRI was performed on September 3 there was enhancement in the area of the prior biopsy of note there was stable benign mass enhancement of the lateral breast located lateral and superior to the biopsy-proven malignancy an additional biopsy was recommended\par A left lateral stereotactic core biopsy showed a tiny focus of atypical ductal hyperplasia and left medial core biopsy revealed atypical ductal hyperplasia.\par On  she had a left mastectomy  there was ductal carcinoma in situ, 7 mm in maximum dimension   cribriform and micropapillary and nuclear grade 2; one sentinel lymph node was negative and one left internal mammary node was negative\par A few years ago she had lancing of a cyst of some sort  on the superficial aspect of her right breast\par She gets regular mammograms. She has no history of hormonal exposure she had hysterectomy and salpingo-oophorectomy at age 46.\par She had 3 pregnancies;  one child  at 6 months with chromosomal abnormalities the other 2 children are in good health there is no family history of breast or ovarian cancer6\par active medical issues or diabetes, hypertension and hypercholesterolemia\par She has a history of a intermittently elevated calcium and saw endocrinologist 2 years ago (calcium elevated in [place])\par Her last bone density was 2 years ago and she was told was normal

## 2019-08-26 NOTE — ASSESSMENT
[FreeTextEntry1] : Ms. BRENDAN CRAVEN  is a 64/f dx with left breast high grade DCIS (ER 90, OK 10) in 7/ 2016 and is on treatment with Anastrozole since 12/2016. She is s/p left mastectomy 11/2016. \par 1. Breast ca- Tolerating Anastrozole well, good compliance. She has mild side effects from the treatment. Continue treatment x 5 yrs. Annual breast imaging reviewed\par 2. Osteopenia: concern for worsening bone density and fractures due to anastrozole. Rec to  continue calcium and vit D. DEXA 1-2 yrs. \par 3. Low Vitamin D: concern for worsening bone loss due to anastrozole and low vit D.Continue suppls and levels f/b endo\par 4. High cholesterol: concern for worsening cholesterol due to anastrozole. Pt is on pravastatin. Lipid profile annually. Life style modifications reviewed\par 5. Mild hot flashes: 2/2 anastrozole. Advised to wear layers and use fan prn. Consider Effexor if get worse.\par 6. hyperparathyroid: s/p surgery. f/B ENDO AND SURGERY\par RTC 6 m

## 2019-08-26 NOTE — REASON FOR VISIT
[Follow-Up Visit] : a follow-up [Other: _____] : [unfilled] [FreeTextEntry2] : Ductal carcinoma in situ of the left breast

## 2019-09-12 ENCOUNTER — APPOINTMENT (OUTPATIENT)
Dept: ENDOCRINOLOGY | Facility: CLINIC | Age: 64
End: 2019-09-12
Payer: COMMERCIAL

## 2019-09-12 VITALS
HEART RATE: 70 BPM | HEIGHT: 61 IN | BODY MASS INDEX: 34.36 KG/M2 | SYSTOLIC BLOOD PRESSURE: 130 MMHG | WEIGHT: 182 LBS | OXYGEN SATURATION: 98 % | DIASTOLIC BLOOD PRESSURE: 80 MMHG

## 2019-09-12 LAB — HBA1C MFR BLD HPLC: 6.4

## 2019-09-12 PROCEDURE — 83036 HEMOGLOBIN GLYCOSYLATED A1C: CPT | Mod: QW

## 2019-09-12 PROCEDURE — 99214 OFFICE O/P EST MOD 30 MIN: CPT | Mod: 25

## 2019-09-18 ENCOUNTER — RX RENEWAL (OUTPATIENT)
Age: 64
End: 2019-09-18

## 2019-09-18 LAB
25(OH)D3 SERPL-MCNC: 18.1 NG/ML
ALBUMIN SERPL ELPH-MCNC: 4.5 G/DL
ALP BLD-CCNC: 95 U/L
ALT SERPL-CCNC: 19 U/L
ANION GAP SERPL CALC-SCNC: 13 MMOL/L
AST SERPL-CCNC: 13 U/L
BILIRUB SERPL-MCNC: <0.2 MG/DL
BUN SERPL-MCNC: 12 MG/DL
CALCIUM SERPL-MCNC: 10 MG/DL
CALCIUM SERPL-MCNC: 10 MG/DL
CHLORIDE SERPL-SCNC: 101 MMOL/L
CO2 SERPL-SCNC: 25 MMOL/L
CREAT SERPL-MCNC: 0.69 MG/DL
CREAT SPEC-SCNC: 55 MG/DL
GLUCOSE SERPL-MCNC: 91 MG/DL
MICROALBUMIN 24H UR DL<=1MG/L-MCNC: <1.2 MG/DL
MICROALBUMIN/CREAT 24H UR-RTO: NORMAL MG/G
PARATHYROID HORMONE INTACT: 33 PG/ML
POTASSIUM SERPL-SCNC: 4.6 MMOL/L
PROT SERPL-MCNC: 7.1 G/DL
SODIUM SERPL-SCNC: 139 MMOL/L
T4 FREE SERPL-MCNC: 1.1 NG/DL
TSH SERPL-ACNC: 1.6 UIU/ML

## 2019-10-15 ENCOUNTER — APPOINTMENT (OUTPATIENT)
Dept: SURGERY | Facility: CLINIC | Age: 64
End: 2019-10-15
Payer: COMMERCIAL

## 2019-10-15 PROCEDURE — 99213 OFFICE O/P EST LOW 20 MIN: CPT

## 2019-10-15 NOTE — PHYSICAL EXAM
[de-identified] : healing scar [de-identified] : no palpable thyroid nodules [Laryngoscopy Performed] : laryngoscopy was performed, see procedure section for findings [Midline] : located in midline position [Normal] : orientation to person, place, and time: normal [de-identified] : negative Chvostek's sign

## 2019-10-15 NOTE — HISTORY OF PRESENT ILLNESS
[de-identified] : 4 months s/p parathyroidectomy. denies dysphagia, hoarseness or perioral or hand tingling. recent calcium and PTH normal. on vitamin D replacement. being worked up for arrhythmia

## 2019-12-03 ENCOUNTER — APPOINTMENT (OUTPATIENT)
Dept: SURGICAL ONCOLOGY | Facility: CLINIC | Age: 64
End: 2019-12-03
Payer: COMMERCIAL

## 2019-12-03 VITALS
BODY MASS INDEX: 35.87 KG/M2 | HEART RATE: 74 BPM | DIASTOLIC BLOOD PRESSURE: 88 MMHG | WEIGHT: 190 LBS | SYSTOLIC BLOOD PRESSURE: 161 MMHG | HEIGHT: 61 IN | OXYGEN SATURATION: 97 %

## 2019-12-03 PROCEDURE — 99213 OFFICE O/P EST LOW 20 MIN: CPT

## 2019-12-05 NOTE — ASSESSMENT
[FreeTextEntry1] : No evidence of breast cancer recurrence.\par Symptomatic right inner breast skin epidermal inclusion cyst.\par \par Plan: Will plan office excision of right breast epidermal inclusion cyst.  Next mammogram 03/2020.

## 2019-12-05 NOTE — HISTORY OF PRESENT ILLNESS
[de-identified] : Clara is a 62 year old female presents for a breast cancer follow up visit. She is s/p left mastectomy, left axillary sentinel & non sentinel lymph node biopsy with LINDA reconstruction by Dr. Thomas in November 2016. FInal pathology revealed a 7 mm DCIS, cribriform, micropapillary type, intermediate grade, no necrosis, negative lymph nodes, ER/NH(+). Tumor staging pTis pN0. She is currently on Anastrozole under the supervision of Dr. Elkins, tolerating well. \par \par 02/13/2018:  Patient presents for 6 months follow up.  She continues on Anastrazole for which she is tolerating.  She has occasional hot flashes but denies joint aches.  She denies new nodularity in the reconstructed left breast and offers no symptoms on the right breast.  Patient last underwent right breast mammogram and breast ultrasound 09/15/2017 which revealed 4 subcentimeter circumscribed masses in the medial right breast without ultrasound correlate - BIRADS 3.  She is due for 6 months follow up right mammogram in 03/2018.\par \par 10/09/2018:  Patient's 6 months follow up right mammogram 03/2018 - BI-RADS 2.  Offers no new complaints.  Was treated recently for Helicobacter Pylori and UTI.\par \par 04/09/2019:  Presenting for 6 months follow up.  Patient feels well and offers no new complaints.  She denies right breast pain, mass or skin thickening.   She had annual right mammogram 03/30/3019 - BI-RADS 1.\par \par 12/03/2019: Patient presents for 6 months follow up.  She reports a recurrent draining cyst of the inner right breast.  She denies other breast related issues.  She is due for annual mammogram 03/2020.

## 2019-12-05 NOTE — PHYSICAL EXAM
[FreeTextEntry1] : Left breast: acquired absence.  Well healed mastectomy and LINDA flap without nodularity or skin thickening.  No axillary adenopathy.\par \par Right breast:  small cutaneous nodule inner right breast skin consistent with epidermal inclusion cyst. [Normal] : supple, no neck mass and thyroid not enlarged [Normal Neck Lymph Nodes] : normal neck lymph nodes  [Normal Supraclavicular Lymph Nodes] : normal supraclavicular lymph nodes [Normal Axillary Lymph Nodes] : normal axillary lymph nodes [Normal Groin Lymph Nodes] : normal groin lymph nodes [Normal] : oriented to person, place and time, with appropriate affect

## 2019-12-16 ENCOUNTER — LABORATORY RESULT (OUTPATIENT)
Age: 64
End: 2019-12-16

## 2019-12-17 ENCOUNTER — APPOINTMENT (OUTPATIENT)
Dept: SURGICAL ONCOLOGY | Facility: CLINIC | Age: 64
End: 2019-12-17
Payer: COMMERCIAL

## 2019-12-17 PROCEDURE — 11403 EXC TR-EXT B9+MARG 2.1-3CM: CPT

## 2019-12-18 NOTE — PROCEDURE
[FreeTextEntry1] : Informed consent obtained.\par Right breast prepped with Betadine.\par Area of excision marked - specimen measured 2.1 x 1.0 cm.\par Local anesthesia infiltrated.\par Excision performed to underlying subcutaneous tissue.  Noted chronically inflamed and thickened skin and superficial subcutaneous tissue.\par Simple closure with 3-0 Monocryl.\par Sterile dressing applied.\par Patient tolerated procedure well.

## 2019-12-18 NOTE — HISTORY OF PRESENT ILLNESS
[de-identified] : Clara is a 62 year old female presents for a breast cancer follow up visit. She is s/p left mastectomy, left axillary sentinel & non sentinel lymph node biopsy with LINDA reconstruction by Dr. Thomas in November 2016. FInal pathology revealed a 7 mm DCIS, cribriform, micropapillary type, intermediate grade, no necrosis, negative lymph nodes, ER/AL(+). Tumor staging pTis pN0. She is currently on Anastrozole under the supervision of Dr. Elkins, tolerating well. \par \par 02/13/2018:  Patient presents for 6 months follow up.  She continues on Anastrazole for which she is tolerating.  She has occasional hot flashes but denies joint aches.  She denies new nodularity in the reconstructed left breast and offers no symptoms on the right breast.  Patient last underwent right breast mammogram and breast ultrasound 09/15/2017 which revealed 4 subcentimeter circumscribed masses in the medial right breast without ultrasound correlate - BIRADS 3.  She is due for 6 months follow up right mammogram in 03/2018.\par \par 10/09/2018:  Patient's 6 months follow up right mammogram 03/2018 - BI-RADS 2.  Offers no new complaints.  Was treated recently for Helicobacter Pylori and UTI.\par \par 04/09/2019:  Presenting for 6 months follow up.  Patient feels well and offers no new complaints.  She denies right breast pain, mass or skin thickening.   She had annual right mammogram 03/30/3019 - BI-RADS 1.\par \par 12/03/2019: Patient presents for 6 months follow up.  She reports a recurrent draining cyst of the inner right breast.  She denies other breast related issues.  She is due for annual mammogram 03/2020.\par \par 12/17/2019: Patient presents for excision of right breast inner cutaneous cyst.  Please see procedure note below.

## 2019-12-24 ENCOUNTER — APPOINTMENT (OUTPATIENT)
Dept: SURGICAL ONCOLOGY | Facility: CLINIC | Age: 64
End: 2019-12-24
Payer: COMMERCIAL

## 2019-12-24 VITALS
DIASTOLIC BLOOD PRESSURE: 85 MMHG | OXYGEN SATURATION: 97 % | WEIGHT: 185 LBS | HEIGHT: 61 IN | RESPIRATION RATE: 16 BRPM | SYSTOLIC BLOOD PRESSURE: 133 MMHG | BODY MASS INDEX: 34.93 KG/M2 | HEART RATE: 78 BPM

## 2019-12-24 PROCEDURE — 99024 POSTOP FOLLOW-UP VISIT: CPT

## 2019-12-24 NOTE — HISTORY OF PRESENT ILLNESS
[de-identified] : Clara is a 62 year old female presents for a breast cancer follow up visit. She is s/p left mastectomy, left axillary sentinel & non sentinel lymph node biopsy with LINDA reconstruction by Dr. Thomas in November 2016. FInal pathology revealed a 7 mm DCIS, cribriform, micropapillary type, intermediate grade, no necrosis, negative lymph nodes, ER/RI(+). Tumor staging pTis pN0. She is currently on Anastrozole under the supervision of Dr. Elkins, tolerating well. \par \par 02/13/2018:  Patient presents for 6 months follow up.  She continues on Anastrazole for which she is tolerating.  She has occasional hot flashes but denies joint aches.  She denies new nodularity in the reconstructed left breast and offers no symptoms on the right breast.  Patient last underwent right breast mammogram and breast ultrasound 09/15/2017 which revealed 4 subcentimeter circumscribed masses in the medial right breast without ultrasound correlate - BIRADS 3.  She is due for 6 months follow up right mammogram in 03/2018.\par \par 10/09/2018:  Patient's 6 months follow up right mammogram 03/2018 - BI-RADS 2.  Offers no new complaints.  Was treated recently for Helicobacter Pylori and UTI.\par \par 04/09/2019:  Presenting for 6 months follow up.  Patient feels well and offers no new complaints.  She denies right breast pain, mass or skin thickening.   She had annual right mammogram 03/30/3019 - BI-RADS 1.\par \par 12/03/2019: Patient presents for 6 months follow up.  She reports a recurrent draining cyst of the inner right breast.  She denies other breast related issues.  She is due for annual mammogram 03/2020.\par \par 12/17/2019: Patient presents for excision of right breast inner cutaneous cyst.  Please see procedure note below.\par \par 12/24/2019: Doing well after excision.  Denies pain.  Pathology pending at this time.

## 2020-02-18 ENCOUNTER — OUTPATIENT (OUTPATIENT)
Dept: OUTPATIENT SERVICES | Facility: HOSPITAL | Age: 65
LOS: 1 days | Discharge: HOME | End: 2020-02-18

## 2020-02-18 DIAGNOSIS — Z98.89 OTHER SPECIFIED POSTPROCEDURAL STATES: Chronic | ICD-10-CM

## 2020-02-18 DIAGNOSIS — Z90.12 ACQUIRED ABSENCE OF LEFT BREAST AND NIPPLE: Chronic | ICD-10-CM

## 2020-02-18 DIAGNOSIS — Z90.710 ACQUIRED ABSENCE OF BOTH CERVIX AND UTERUS: Chronic | ICD-10-CM

## 2020-02-18 DIAGNOSIS — D05.10 INTRADUCTAL CARCINOMA IN SITU OF UNSPECIFIED BREAST: ICD-10-CM

## 2020-02-20 ENCOUNTER — APPOINTMENT (OUTPATIENT)
Dept: ENDOCRINOLOGY | Facility: CLINIC | Age: 65
End: 2020-02-20
Payer: MEDICARE

## 2020-02-20 VITALS
SYSTOLIC BLOOD PRESSURE: 132 MMHG | HEART RATE: 84 BPM | OXYGEN SATURATION: 97 % | HEIGHT: 61 IN | DIASTOLIC BLOOD PRESSURE: 82 MMHG | BODY MASS INDEX: 35.87 KG/M2 | WEIGHT: 190 LBS

## 2020-02-20 PROCEDURE — 83036 HEMOGLOBIN GLYCOSYLATED A1C: CPT | Mod: QW

## 2020-02-20 PROCEDURE — 99214 OFFICE O/P EST MOD 30 MIN: CPT

## 2020-02-24 ENCOUNTER — APPOINTMENT (OUTPATIENT)
Dept: HEMATOLOGY ONCOLOGY | Facility: CLINIC | Age: 65
End: 2020-02-24
Payer: MEDICARE

## 2020-02-24 VITALS
SYSTOLIC BLOOD PRESSURE: 144 MMHG | DIASTOLIC BLOOD PRESSURE: 75 MMHG | BODY MASS INDEX: 36.66 KG/M2 | WEIGHT: 194.01 LBS | OXYGEN SATURATION: 100 % | TEMPERATURE: 97.7 F | RESPIRATION RATE: 18 BRPM | HEART RATE: 68 BPM

## 2020-02-24 PROCEDURE — 99214 OFFICE O/P EST MOD 30 MIN: CPT

## 2020-02-24 RX ORDER — CEPHALEXIN 500 MG/1
500 CAPSULE ORAL 3 TIMES DAILY
Qty: 15 | Refills: 0 | Status: DISCONTINUED | COMMUNITY
Start: 2019-12-18 | End: 2020-02-24

## 2020-02-24 NOTE — CONSULT LETTER
[Dear  ___] : Dear  [unfilled], [( Thank you for referring [unfilled] for consultation for _____ )] : Thank you for referring [unfilled] for consultation for [unfilled] [Consult Closing:] : Thank you very much for allowing me to participate in the care of this patient.  If you have any questions, please do not hesitate to contact me. [Please see my note below.] : Please see my note below. [Sincerely,] : Sincerely, [FreeTextEntry2] : Cirilo Foy M.D. [FreeTextEntry1] : I have recommended anastrozole to reduce the risk of another event in the right breast. [FreeTextEntry3] : Anuj Elkins M.D.

## 2020-02-24 NOTE — ASSESSMENT
[FreeTextEntry1] : Ms. BRENDAN CRAVEN  is a 64/f dx with left breast high grade DCIS (ER 90, ID 10) in 7/ 2016 and is on treatment with Anastrozole since 12/2016. She is s/p left mastectomy 11/2016. \par 1. Breast ca- Tolerating Anastrozole well, good compliance. She has mild side effects from the treatment. Continue treatment x 5 yrs. Annual breast imaging reviewed\par 2. Osteopenia: concern for worsening bone density and fractures due to anastrozole. Rec to  continue calcium and vit D. DEXA 1-2 yrs. \par 3. Low Vitamin D: concern for worsening bone loss due to anastrozole and low vit D.Continue suppls and levels f/b endo\par 4. High cholesterol: concern for worsening cholesterol due to anastrozole. Pt is on pravastatin. Lipid profile annually. Life style modifications reviewed\par 5. Mild hot flashes: 2/2 anastrozole. Advised to wear layers and use fan prn. Consider Effexor if get worse.\par 6. hyperparathyroid: s/p surgery. f/B ENDO AND SURGERY\par RTC 6 m

## 2020-02-24 NOTE — PHYSICAL EXAM
[Fully active, able to carry on all pre-disease performance without restriction] : Status 0 - Fully active, able to carry on all pre-disease performance without restriction [Normal] : affect appropriate [Obese] : obese [de-identified] : left mastectomy with reconstruction. No chest wall or axillary nodules. Right breast exam normal  [de-identified] : scar from appendectomy and LINDA flap- healed well [de-identified] : psoriasis patches on BL elbow

## 2020-02-24 NOTE — HISTORY OF PRESENT ILLNESS
[de-identified] : Ms. Soto is being referred by Dr. Cirilo Foy for evaluation  for any additional therapy after her left mastectomy for DCIS.\par She was in her usual state of health until she had a screening mammography on . Calcifications were noted in the left central posterior breast she was brought back for a digital mammogram which revealed calcifications in the central posterior left breast  which was new from prior examination.  Stereotactic biopsy revealed ductal carcinoma in situ, solid pattern with high nuclear grade. There were microcalcifications present in the DCIS as well as benign tissue estrogen receptor was seen in 95% of nuclei and progesterone receptor and 10%.\par Bilateral breast MRI was performed on September 3 there was enhancement in the area of the prior biopsy of note there was stable benign mass enhancement of the lateral breast located lateral and superior to the biopsy-proven malignancy an additional biopsy was recommended\par A left lateral stereotactic core biopsy showed a tiny focus of atypical ductal hyperplasia and left medial core biopsy revealed atypical ductal hyperplasia.\par On  she had a left mastectomy  there was ductal carcinoma in situ, 7 mm in maximum dimension   cribriform and micropapillary and nuclear grade 2; one sentinel lymph node was negative and one left internal mammary node was negative\par A few years ago she had lancing of a cyst of some sort  on the superficial aspect of her right breast\par She gets regular mammograms. She has no history of hormonal exposure she had hysterectomy and salpingo-oophorectomy at age 46.\par She had 3 pregnancies;  one child  at 6 months with chromosomal abnormalities the other 2 children are in good health there is no family history of breast or ovarian cancer6\par active medical issues or diabetes, hypertension and hypercholesterolemia\par She has a history of a intermittently elevated calcium and saw endocrinologist 2 years ago (calcium elevated in [place])\par Her last bone density was 2 years ago and she was told was normal [de-identified] : Ms. BRENDAN CRAVEN  is here for a follow up appt for left breast high grade DCIS (ER 90, AK 10) in 7/ 2016 and is on treatment with Anastrozole since 12/2016. Tx care from Dr DONNELLY in 8/2019. \par Takes Anastrozole daily, good compliance. She has mild hot flashes. She has shoulder tendonitis ( confirmed on MRI)  and takes tylenol PRN. Reminded to see ortho for PT/injection. Pain hasn’t gotten worse with anastrozole. She denies vag dryness, excessive fatigue, GI s/e, hair loss. She is active, no change in energy, wt or appetite. \par mammogram 3/2019 -   BIRDAS 1\par DEXA 4/2019 - osteopenia:  She takes vit D. She is f/b endo for hypercal. She recently had parathyroid surgery.\par CT C/A/P was negative for acute issues or malignancy 7/2019 (done due to cardiac reasons). she is f/b cardio\par \par

## 2020-02-27 LAB
25(OH)D3 SERPL-MCNC: 25.7 NG/ML
ALBUMIN SERPL ELPH-MCNC: 4.8 G/DL
ALP BLD-CCNC: 88 U/L
ALT SERPL-CCNC: 12 U/L
ANION GAP SERPL CALC-SCNC: 14 MMOL/L
AST SERPL-CCNC: 12 U/L
BILIRUB SERPL-MCNC: <0.2 MG/DL
BUN SERPL-MCNC: 14 MG/DL
CALCIUM SERPL-MCNC: 9.8 MG/DL
CALCIUM SERPL-MCNC: 9.8 MG/DL
CHLORIDE SERPL-SCNC: 102 MMOL/L
CO2 SERPL-SCNC: 24 MMOL/L
CREAT SERPL-MCNC: 0.77 MG/DL
GLUCOSE SERPL-MCNC: 124 MG/DL
HBA1C MFR BLD HPLC: 7
PARATHYROID HORMONE INTACT: 35 PG/ML
POTASSIUM SERPL-SCNC: 4.3 MMOL/L
PROT SERPL-MCNC: 7.1 G/DL
SODIUM SERPL-SCNC: 140 MMOL/L

## 2020-03-10 ENCOUNTER — OUTPATIENT (OUTPATIENT)
Dept: OUTPATIENT SERVICES | Facility: HOSPITAL | Age: 65
LOS: 1 days | End: 2020-03-10

## 2020-03-10 ENCOUNTER — APPOINTMENT (OUTPATIENT)
Dept: MAMMOGRAPHY | Facility: IMAGING CENTER | Age: 65
End: 2020-03-10

## 2020-03-10 DIAGNOSIS — Z90.710 ACQUIRED ABSENCE OF BOTH CERVIX AND UTERUS: Chronic | ICD-10-CM

## 2020-03-10 DIAGNOSIS — D05.10 INTRADUCTAL CARCINOMA IN SITU OF UNSPECIFIED BREAST: ICD-10-CM

## 2020-03-10 DIAGNOSIS — Z90.12 ACQUIRED ABSENCE OF LEFT BREAST AND NIPPLE: Chronic | ICD-10-CM

## 2020-03-10 DIAGNOSIS — Z98.89 OTHER SPECIFIED POSTPROCEDURAL STATES: Chronic | ICD-10-CM

## 2020-03-19 ENCOUNTER — APPOINTMENT (OUTPATIENT)
Dept: PULMONOLOGY | Facility: CLINIC | Age: 65
End: 2020-03-19

## 2020-04-21 ENCOUNTER — APPOINTMENT (OUTPATIENT)
Dept: SURGERY | Facility: CLINIC | Age: 65
End: 2020-04-21

## 2020-05-18 ENCOUNTER — OUTPATIENT (OUTPATIENT)
Dept: OUTPATIENT SERVICES | Facility: HOSPITAL | Age: 65
LOS: 1 days | End: 2020-05-18
Payer: COMMERCIAL

## 2020-05-18 ENCOUNTER — RESULT REVIEW (OUTPATIENT)
Age: 65
End: 2020-05-18

## 2020-05-18 ENCOUNTER — APPOINTMENT (OUTPATIENT)
Dept: ULTRASOUND IMAGING | Facility: IMAGING CENTER | Age: 65
End: 2020-05-18
Payer: MEDICARE

## 2020-05-18 ENCOUNTER — APPOINTMENT (OUTPATIENT)
Dept: MAMMOGRAPHY | Facility: IMAGING CENTER | Age: 65
End: 2020-05-18
Payer: MEDICARE

## 2020-05-18 DIAGNOSIS — Z90.12 ACQUIRED ABSENCE OF LEFT BREAST AND NIPPLE: Chronic | ICD-10-CM

## 2020-05-18 DIAGNOSIS — D05.10 INTRADUCTAL CARCINOMA IN SITU OF UNSPECIFIED BREAST: ICD-10-CM

## 2020-05-18 DIAGNOSIS — Z98.89 OTHER SPECIFIED POSTPROCEDURAL STATES: Chronic | ICD-10-CM

## 2020-05-18 DIAGNOSIS — Z90.710 ACQUIRED ABSENCE OF BOTH CERVIX AND UTERUS: Chronic | ICD-10-CM

## 2020-05-18 PROCEDURE — G0279: CPT

## 2020-05-18 PROCEDURE — 77065 DX MAMMO INCL CAD UNI: CPT | Mod: 26,RT

## 2020-05-18 PROCEDURE — G0279: CPT | Mod: 26

## 2020-05-18 PROCEDURE — 77065 DX MAMMO INCL CAD UNI: CPT

## 2020-06-03 ENCOUNTER — APPOINTMENT (OUTPATIENT)
Dept: PULMONOLOGY | Facility: CLINIC | Age: 65
End: 2020-06-03
Payer: MEDICARE

## 2020-06-03 VITALS
OXYGEN SATURATION: 98 % | WEIGHT: 187 LBS | DIASTOLIC BLOOD PRESSURE: 70 MMHG | RESPIRATION RATE: 17 BRPM | BODY MASS INDEX: 35.3 KG/M2 | TEMPERATURE: 97.9 F | SYSTOLIC BLOOD PRESSURE: 140 MMHG | HEART RATE: 74 BPM | HEIGHT: 61 IN

## 2020-06-03 DIAGNOSIS — R05 COUGH: ICD-10-CM

## 2020-06-03 DIAGNOSIS — K21.9 GASTRO-ESOPHAGEAL REFLUX DISEASE W/OUT ESOPHAGITIS: ICD-10-CM

## 2020-06-03 DIAGNOSIS — R93.89 ABNORMAL FINDINGS ON DIAGNOSTIC IMAGING OF OTHER SPECIFIED BODY STRUCTURES: ICD-10-CM

## 2020-06-03 DIAGNOSIS — Z01.812 ENCOUNTER FOR PREPROCEDURAL LABORATORY EXAMINATION: ICD-10-CM

## 2020-06-03 DIAGNOSIS — R53.83 OTHER FATIGUE: ICD-10-CM

## 2020-06-03 PROCEDURE — 99214 OFFICE O/P EST MOD 30 MIN: CPT

## 2020-06-03 NOTE — ASSESSMENT
[FreeTextEntry1] : The plan for the patient is as follows:\par \par 1.Abnormal CT scan/pulmonary nodule- advised her she can have this at 18 mo jose given stability however since she had COVID19, residual cough and seemingly more episodes of breathiness-\par -F/u CT chest now\par \par 2.snoring, hx of unrefreshing sleep and chronic fatigue\par -recommended HST- patient refused in past- aware of the consequences of untreated sleep apnea\par \par 3.GERD with flare- can contribute to lingering cough\par -continue PPI as per GI\par -add famotidine 40 mg PO QHS PRN\par \par 4.Cardiac complaints, recent chest discomfort\par -has planned follow up with cardiology this Friday\par \par 5.Obesity\par -encouraged increasing activity as tolerated and routine exercise regimen\par -proper diet/well balanced diet\par \par 6.Residual cough, s/p covid; possible RADs?\par -add Symbicort 160 1 puff am and pm rinse and gargle for 2-3 weeks\par -pt to complete FULL PFTS in 1 month with Covid swab prior\par \par 1 month FU with PFTs\par will call w/CT results. \par pt to call if any issues.

## 2020-06-03 NOTE — HISTORY OF PRESENT ILLNESS
[FreeTextEntry1] : This is a 65 year old female with PMHx of GERD, HTN, psoriasis, DM type 2, HLD, gout and  DCIS of L breast s/p left mastectomy w LINDA in 2016. She is for routine f/u.\par \par The patient reports she is in her normal state of health currently.\par Since her last visit- she reports she is s/p parathyroidectomy in June 2019. \par She was Hospitalized in August 2019 for palpitations and GI issues. Issue relating to stress and diet. \par She had a cyst removed from her breast on Right side in Dec 2019. \par In March 2020 she was diagnosed with COVID19, she thinks she got it from her . She did not get hospitalized. She dealt with bad cough, low grade fevers, fatigue and body aches and pains. She recovered but reports she does tend to deal with cough on and off (minimal) which seems to occur more at night when sleeping. \par She reports intermittent, chronic chest tightness and pains- she has a follow up with her cardiologist on Friday. \par She reports chronic fatigue but noted she is more fatigued when going up the stairs or when more exertional. \par Her Last PFTs were in 2016.\par Her last CT chest was in April 2019. \par \par She reports having a lot of stress in her life but does the best she can to deal with it.\par She has hx of GERD and is on PPI daily. Despite use of PPI, she continues to deal with symptoms in the evening on and off. She is trying to watch her diet.

## 2020-06-03 NOTE — PHYSICAL EXAM
[General Appearance - Well Developed] : well developed [Well Groomed] : well groomed [Normal Appearance] : normal appearance [General Appearance - Well Nourished] : well nourished [No Deformities] : no deformities [Normal Conjunctiva] : the conjunctiva exhibited no abnormalities [General Appearance - In No Acute Distress] : no acute distress [Eyelids - No Xanthelasma] : the eyelids demonstrated no xanthelasmas [II] : II [Normal Oropharynx] : normal oropharynx [Neck Appearance] : the appearance of the neck was normal [Neck Cervical Mass (___cm)] : no neck mass was observed [Heart Rate And Rhythm] : heart rate and rhythm were normal [Jugular Venous Distention Increased] : there was no jugular-venous distention [Heart Sounds] : normal S1 and S2 [Arterial Pulses Normal] : the arterial pulses were normal [Edema] : no peripheral edema present [Murmurs] : no murmurs present [Respiration, Rhythm And Depth] : normal respiratory rhythm and effort [Auscultation Breath Sounds / Voice Sounds] : lungs were clear to auscultation bilaterally [Abdomen Tenderness] : non-tender [Abdomen Soft] : soft [Exaggerated Use Of Accessory Muscles For Inspiration] : no accessory muscle use [Abdomen Mass (___ Cm)] : no abdominal mass palpated [Abnormal Walk] : normal gait [Gait - Sufficient For Exercise Testing] : the gait was sufficient for exercise testing [Cyanosis, Localized] : no localized cyanosis [Nail Clubbing] : no clubbing of the fingernails [] : no ischemic changes [Petechial Hemorrhages (___cm)] : no petechial hemorrhages [No Focal Deficits] : no focal deficits [Oriented To Time, Place, And Person] : oriented to person, place, and time [Impaired Insight] : insight and judgment were intact [Affect] : the affect was normal [Mood] : the mood was normal [FreeTextEntry1] : cough with deep breath

## 2020-06-03 NOTE — REVIEW OF SYSTEMS
[Fatigue] : fatigue [Chest Tightness] : chest tightness [Palpitations] : palpitations [Snoring] : snoring [Fever] : no fever [Chills] : no chills [Poor Appetite] : normal appetite  [Dry Eyes] : no dryness of the eyes [Recent Wt Gain (___ Lbs)] : no recent weight gain [Eye Irritation] : no ~T irritation of the eyes [Ear Disturbance] : no ear disturbance [Nasal Congestion] : no nasal congestion [Epistaxis] : no nosebleeds [Postnasal Drip] : no postnasal drip [Sinus Problems] : no sinus problems [Sore Throat] : no sore throat [Dry Mouth] : no dry mouth [Mouth Ulcers] : no mouth ulcers [Cough] : cough [Sputum] : not coughing up ~M sputum [Hemoptysis] : no hemoptysis [Dyspnea] : dyspnea [Pleuritic Pain] : no pleuritic pain [Frequent URIs] : no frequent upper respiratory infections [PND] : no PND [Wheezing] : no wheezing [Dysrhythmia] : no dysrhythmia [Orthopnea] : no orthopnea [Claudication] : no intermittent claudication [Murmurs] : no murmurs were heard [Edema] : ~T edema was not present [Leg Cramps] : no leg cramps [Heartburn] : heartburn [Reflux] : reflux [Dysphagia] : no dysphagia [Indigestion] : no indigestion [Nausea] : no nausea [Vomiting] : no vomiting [Diarrhea] : no diarrhea [Abdominal Pain] : no abdominal pain [Headache] : no headache [Dizziness] : no dizziness [Syncope] : no fainting [Anxiety] : anxiety [As Noted in HPI] : as noted in HPI [Difficulty Initiating Sleep] : no difficulty falling asleep [Dysesthesias] : no dysesthesias [Difficulty Maintaining Sleep] : no difficulty maintaining sleep [Paresthesias] : no paresthesias [Unusual Sleep Behavior] : no unusual sleep behavior [Unusual Movements] : no involuntary movements during sleep [Witnessed Apneas] : demonstrated no ~M apnea [Awakes With Headache] : awakes without a headache [Nonrestorative Sleep] : restorative sleep [Awakes With Dry Mouth] : awakes without dry mouth [Hypersomnolence] : not sleeping much more than usual [Cataplexy] :  no cataplexy [Sleep Paralysis] : no sleep paralysis [Hypnogogic Hallucinations] : no hypnogogic hallucinations [Hypnopompic Hallucinations] : no hypnopompic hallucinations [Negative] : Dermatologic

## 2020-06-03 NOTE — REASON FOR VISIT
[Follow-Up] : a follow-up visit [Abnormal CXR/ Chest CT] : an abnormal CXR/ chest CT [TextBox_[id number]] : s/p COVID19

## 2020-06-09 ENCOUNTER — OUTPATIENT (OUTPATIENT)
Dept: OUTPATIENT SERVICES | Facility: HOSPITAL | Age: 65
LOS: 1 days | End: 2020-06-09
Payer: COMMERCIAL

## 2020-06-09 ENCOUNTER — APPOINTMENT (OUTPATIENT)
Dept: CT IMAGING | Facility: IMAGING CENTER | Age: 65
End: 2020-06-09
Payer: MEDICARE

## 2020-06-09 DIAGNOSIS — Z98.89 OTHER SPECIFIED POSTPROCEDURAL STATES: Chronic | ICD-10-CM

## 2020-06-09 DIAGNOSIS — R93.89 ABNORMAL FINDINGS ON DIAGNOSTIC IMAGING OF OTHER SPECIFIED BODY STRUCTURES: ICD-10-CM

## 2020-06-09 DIAGNOSIS — Z90.12 ACQUIRED ABSENCE OF LEFT BREAST AND NIPPLE: Chronic | ICD-10-CM

## 2020-06-09 DIAGNOSIS — Z90.710 ACQUIRED ABSENCE OF BOTH CERVIX AND UTERUS: Chronic | ICD-10-CM

## 2020-06-09 PROCEDURE — 71250 CT THORAX DX C-: CPT | Mod: 26

## 2020-06-09 PROCEDURE — 71250 CT THORAX DX C-: CPT

## 2020-06-17 ENCOUNTER — APPOINTMENT (OUTPATIENT)
Dept: SURGERY | Facility: CLINIC | Age: 65
End: 2020-06-17
Payer: MEDICARE

## 2020-06-17 DIAGNOSIS — E21.3 HYPERPARATHYROIDISM, UNSPECIFIED: ICD-10-CM

## 2020-06-17 LAB
CALCIUM SERPL-MCNC: 10 MG/DL
CALCIUM SERPL-MCNC: 10 MG/DL
PARATHYROID HORMONE INTACT: 36 PG/ML

## 2020-06-17 PROCEDURE — 99213 OFFICE O/P EST LOW 20 MIN: CPT

## 2020-06-17 PROCEDURE — 36415 COLL VENOUS BLD VENIPUNCTURE: CPT

## 2020-06-17 NOTE — HISTORY OF PRESENT ILLNESS
[de-identified] : 1 year s/p parathyroidectomy. denies dysphagia, hoarseness or perioral or hand tingling. recently recovered from Covid-19.  continued on vitamin D

## 2020-06-17 NOTE — PHYSICAL EXAM
[de-identified] : healing scar [de-identified] : no palpable thyroid nodules [Midline] : located in midline position [Normal] : orientation to person, place, and time: normal [de-identified] : negative Chvostek's sign

## 2020-06-18 LAB — 24R-OH-CALCIDIOL SERPL-MCNC: 19.1 PG/ML

## 2020-06-30 ENCOUNTER — APPOINTMENT (OUTPATIENT)
Dept: SURGICAL ONCOLOGY | Facility: CLINIC | Age: 65
End: 2020-06-30
Payer: MEDICARE

## 2020-06-30 VITALS
HEIGHT: 62 IN | HEART RATE: 83 BPM | BODY MASS INDEX: 34.96 KG/M2 | SYSTOLIC BLOOD PRESSURE: 130 MMHG | RESPIRATION RATE: 17 BRPM | DIASTOLIC BLOOD PRESSURE: 79 MMHG | WEIGHT: 190 LBS | OXYGEN SATURATION: 97 %

## 2020-06-30 VITALS — TEMPERATURE: 98.3 F

## 2020-06-30 PROCEDURE — 99213 OFFICE O/P EST LOW 20 MIN: CPT

## 2020-06-30 NOTE — PHYSICAL EXAM
[FreeTextEntry1] : Right breast exam within normal.\par Left mastectomy flaps without nodularity. [Normal] : supple, no neck mass and thyroid not enlarged [Normal Neck Lymph Nodes] : normal neck lymph nodes  [Normal Supraclavicular Lymph Nodes] : normal supraclavicular lymph nodes [Normal Groin Lymph Nodes] : normal groin lymph nodes [Normal Axillary Lymph Nodes] : normal axillary lymph nodes [Normal] : oriented to person, place and time, with appropriate affect

## 2020-06-30 NOTE — ASSESSMENT
[FreeTextEntry1] : No evidence of left breast cancer recurrence.\par Benign findings in right breast.\par \par Plan: Follow up in one year with annual right mammogram.

## 2020-06-30 NOTE — HISTORY OF PRESENT ILLNESS
[de-identified] : Clara is a 62 year old female presents for a breast cancer follow up visit. She is s/p left mastectomy, left axillary sentinel & non sentinel lymph node biopsy with LINDA reconstruction by Dr. Thomas in November 2016. FInal pathology revealed a 7 mm DCIS, cribriform, micropapillary type, intermediate grade, no necrosis, negative lymph nodes, ER/KS(+). Tumor staging pTis pN0. She is currently on Anastrozole under the supervision of Dr. Elkins, tolerating well. \par \par 02/13/2018:  Patient presents for 6 months follow up.  She continues on Anastrazole for which she is tolerating.  She has occasional hot flashes but denies joint aches.  She denies new nodularity in the reconstructed left breast and offers no symptoms on the right breast.  Patient last underwent right breast mammogram and breast ultrasound 09/15/2017 which revealed 4 subcentimeter circumscribed masses in the medial right breast without ultrasound correlate - BIRADS 3.  She is due for 6 months follow up right mammogram in 03/2018.\par \par 10/09/2018:  Patient's 6 months follow up right mammogram 03/2018 - BI-RADS 2.  Offers no new complaints.  Was treated recently for Helicobacter Pylori and UTI.\par \par 04/09/2019:  Presenting for 6 months follow up.  Patient feels well and offers no new complaints.  She denies right breast pain, mass or skin thickening.   She had annual right mammogram 03/30/3019 - BI-RADS 1.\par \par 12/03/2019: Patient presents for 6 months follow up.  She reports a recurrent draining cyst of the inner right breast.  She denies other breast related issues.  She is due for annual mammogram 03/2020.\par \par 12/17/2019: Patient presents for excision of right breast inner cutaneous cyst.  Please see procedure note below.\par \par 12/24/2019: Doing well after excision.  Denies pain.  Pathology pending at this time.\par \par 06/30/2019: Doing well.  Recovered from COVID-19.  Denies breast related symptoms.  Right mammogram 05/2020 - BI-RADS 2.

## 2020-07-09 ENCOUNTER — APPOINTMENT (OUTPATIENT)
Dept: PULMONOLOGY | Facility: CLINIC | Age: 65
End: 2020-07-09

## 2020-07-21 ENCOUNTER — APPOINTMENT (OUTPATIENT)
Dept: ENDOCRINOLOGY | Facility: CLINIC | Age: 65
End: 2020-07-21

## 2020-08-22 ENCOUNTER — OUTPATIENT (OUTPATIENT)
Dept: OUTPATIENT SERVICES | Facility: HOSPITAL | Age: 65
LOS: 1 days | Discharge: HOME | End: 2020-08-22

## 2020-08-22 DIAGNOSIS — Z98.89 OTHER SPECIFIED POSTPROCEDURAL STATES: Chronic | ICD-10-CM

## 2020-08-22 DIAGNOSIS — D05.10 INTRADUCTAL CARCINOMA IN SITU OF UNSPECIFIED BREAST: ICD-10-CM

## 2020-08-22 DIAGNOSIS — Z90.12 ACQUIRED ABSENCE OF LEFT BREAST AND NIPPLE: Chronic | ICD-10-CM

## 2020-08-22 DIAGNOSIS — Z90.710 ACQUIRED ABSENCE OF BOTH CERVIX AND UTERUS: Chronic | ICD-10-CM

## 2020-08-28 ENCOUNTER — RESULT REVIEW (OUTPATIENT)
Age: 65
End: 2020-08-28

## 2020-08-28 ENCOUNTER — APPOINTMENT (OUTPATIENT)
Dept: HEMATOLOGY ONCOLOGY | Facility: CLINIC | Age: 65
End: 2020-08-28
Payer: MEDICARE

## 2020-08-28 VITALS
RESPIRATION RATE: 16 BRPM | TEMPERATURE: 99 F | BODY MASS INDEX: 38.06 KG/M2 | SYSTOLIC BLOOD PRESSURE: 134 MMHG | DIASTOLIC BLOOD PRESSURE: 81 MMHG | OXYGEN SATURATION: 96 % | WEIGHT: 196.43 LBS | HEART RATE: 77 BPM | HEIGHT: 60.24 IN

## 2020-08-28 LAB
BASOPHILS # BLD AUTO: 0.04 K/UL — SIGNIFICANT CHANGE UP (ref 0–0.2)
BASOPHILS NFR BLD AUTO: 0.5 % — SIGNIFICANT CHANGE UP (ref 0–2)
EOSINOPHIL # BLD AUTO: 0.2 K/UL — SIGNIFICANT CHANGE UP (ref 0–0.5)
EOSINOPHIL NFR BLD AUTO: 2.7 % — SIGNIFICANT CHANGE UP (ref 0–6)
HCT VFR BLD CALC: 37.5 % — SIGNIFICANT CHANGE UP (ref 34.5–45)
HGB BLD-MCNC: 11.9 G/DL — SIGNIFICANT CHANGE UP (ref 11.5–15.5)
IMM GRANULOCYTES NFR BLD AUTO: 0.5 % — SIGNIFICANT CHANGE UP (ref 0–1.5)
LYMPHOCYTES # BLD AUTO: 2.07 K/UL — SIGNIFICANT CHANGE UP (ref 1–3.3)
LYMPHOCYTES # BLD AUTO: 28.1 % — SIGNIFICANT CHANGE UP (ref 13–44)
MCHC RBC-ENTMCNC: 26.4 PG — LOW (ref 27–34)
MCHC RBC-ENTMCNC: 31.7 GM/DL — LOW (ref 32–36)
MCV RBC AUTO: 83.3 FL — SIGNIFICANT CHANGE UP (ref 80–100)
MONOCYTES # BLD AUTO: 0.66 K/UL — SIGNIFICANT CHANGE UP (ref 0–0.9)
MONOCYTES NFR BLD AUTO: 9 % — SIGNIFICANT CHANGE UP (ref 2–14)
NEUTROPHILS # BLD AUTO: 4.36 K/UL — SIGNIFICANT CHANGE UP (ref 1.8–7.4)
NEUTROPHILS NFR BLD AUTO: 59.2 % — SIGNIFICANT CHANGE UP (ref 43–77)
NRBC # BLD: 0 /100 WBCS — SIGNIFICANT CHANGE UP (ref 0–0)
PLATELET # BLD AUTO: 214 K/UL — SIGNIFICANT CHANGE UP (ref 150–400)
RBC # BLD: 4.5 M/UL — SIGNIFICANT CHANGE UP (ref 3.8–5.2)
RBC # FLD: 14.9 % — HIGH (ref 10.3–14.5)
WBC # BLD: 7.37 K/UL — SIGNIFICANT CHANGE UP (ref 3.8–10.5)
WBC # FLD AUTO: 7.37 K/UL — SIGNIFICANT CHANGE UP (ref 3.8–10.5)

## 2020-08-28 PROCEDURE — 99214 OFFICE O/P EST MOD 30 MIN: CPT

## 2020-09-01 LAB
ALBUMIN SERPL ELPH-MCNC: 4.7 G/DL
ALP BLD-CCNC: 84 U/L
ALT SERPL-CCNC: 13 U/L
ANION GAP SERPL CALC-SCNC: 14 MMOL/L
AST SERPL-CCNC: 13 U/L
BILIRUB SERPL-MCNC: 0.2 MG/DL
BUN SERPL-MCNC: 12 MG/DL
CALCIUM SERPL-MCNC: 10.1 MG/DL
CHLORIDE SERPL-SCNC: 103 MMOL/L
CO2 SERPL-SCNC: 24 MMOL/L
CREAT SERPL-MCNC: 0.77 MG/DL
GLUCOSE SERPL-MCNC: 114 MG/DL
POTASSIUM SERPL-SCNC: 4.5 MMOL/L
PROT SERPL-MCNC: 7.1 G/DL
SODIUM SERPL-SCNC: 142 MMOL/L

## 2020-09-07 NOTE — ASSESSMENT
[FreeTextEntry1] : Ms. BRENDAN CRAVEN  is a 64/f dx with left breast high grade DCIS (ER 90, CA 10) in 7/ 2016 and is on treatment with Anastrozole since 12/2016. She is s/p left mastectomy 11/2016. \par 1. Breast ca- Tolerating Anastrozole well, good compliance. She has mild side effects from the treatment. Continue treatment x 5 yrs ( !2/2021) . Annual breast imaging reviewed\par 2. Osteopenia: concern for worsening bone density and fractures due to anastrozole. Rec to  continue calcium and vit D. DEXA 1-2 yrs. \par 3. Low Vitamin D: concern for worsening bone loss due to anastrozole and low vit D.Continue suppls and levels f/b endo\par 4. High cholesterol: concern for worsening cholesterol due to anastrozole. Pt is on pravastatin. Lipid profile annually. Life style modifications reviewed\par 5. Mild hot flashes: 2/2 anastrozole. Advised to wear layers and use fan prn. Consider Effexor if get worse.\par 6. hyperparathyroid: s/p surgery. f/B ENDO AND SURGERY\par RTC 6 m

## 2020-09-07 NOTE — PHYSICAL EXAM
[Fully active, able to carry on all pre-disease performance without restriction] : Status 0 - Fully active, able to carry on all pre-disease performance without restriction [Obese] : obese [Normal] : affect appropriate [de-identified] : psoriasis patches on BL elbow [de-identified] : scar from appendectomy and LINDA flap- healed well [de-identified] : left mastectomy with reconstruction. No chest wall or axillary nodules. Right breast exam normal

## 2020-09-07 NOTE — CONSULT LETTER
[( Thank you for referring [unfilled] for consultation for _____ )] : Thank you for referring [unfilled] for consultation for [unfilled] [Dear  ___] : Dear  [unfilled], [Consult Closing:] : Thank you very much for allowing me to participate in the care of this patient.  If you have any questions, please do not hesitate to contact me. [Please see my note below.] : Please see my note below. [Sincerely,] : Sincerely, [FreeTextEntry2] : Cirilo Foy M.D. [FreeTextEntry1] : I have recommended anastrozole to reduce the risk of another event in the right breast. [FreeTextEntry3] : Anuj Elkins M.D.

## 2020-09-07 NOTE — HISTORY OF PRESENT ILLNESS
[de-identified] : Ms. BRENDAN CRAVEN  is here for a follow up appt for left breast high grade DCIS (ER 90, NC 10) in 7/ 2016 and is on treatment with Anastrozole since 12/2016. Tx care from Dr DONNELLY in 8/2019. \par She was dx with COVID in 3/2020, stayed home and recovered completely. Gained 5 lbs\par Takes Anastrozole daily, good compliance. She has mild hot flashes. She has shoulder tendonitis ( confirmed on MRI)  and takes tylenol PRN. Reminded to see ortho for PT/injection. Pain hasn’t gotten worse with anastrozole. She denies vag dryness, excessive fatigue, GI s/e, hair loss. She is active, no change in energy, wt or appetite. \par mammogram 5/2020   BIRDAS 1\par DEXA 4/2019 - osteopenia:  She takes vit D. She is f/b endo for hypercal. She recently had parathyroid surgery.\par CT C/A/P was negative for acute issues or malignancy 7/2019 (done due to cardiac reasons). she is f/b cardio\par CT 6/2020: stable lung nodules (POST COVID) [de-identified] : Ms. Soto is being referred by Dr. Cirilo Foy for evaluation  for any additional therapy after her left mastectomy for DCIS.\par She was in her usual state of health until she had a screening mammography on . Calcifications were noted in the left central posterior breast she was brought back for a digital mammogram which revealed calcifications in the central posterior left breast  which was new from prior examination.  Stereotactic biopsy revealed ductal carcinoma in situ, solid pattern with high nuclear grade. There were microcalcifications present in the DCIS as well as benign tissue estrogen receptor was seen in 95% of nuclei and progesterone receptor and 10%.\par Bilateral breast MRI was performed on September 3 there was enhancement in the area of the prior biopsy of note there was stable benign mass enhancement of the lateral breast located lateral and superior to the biopsy-proven malignancy an additional biopsy was recommended\par A left lateral stereotactic core biopsy showed a tiny focus of atypical ductal hyperplasia and left medial core biopsy revealed atypical ductal hyperplasia.\par On  she had a left mastectomy  there was ductal carcinoma in situ, 7 mm in maximum dimension   cribriform and micropapillary and nuclear grade 2; one sentinel lymph node was negative and one left internal mammary node was negative\par A few years ago she had lancing of a cyst of some sort  on the superficial aspect of her right breast\par She gets regular mammograms. She has no history of hormonal exposure she had hysterectomy and salpingo-oophorectomy at age 46.\par She had 3 pregnancies;  one child  at 6 months with chromosomal abnormalities the other 2 children are in good health there is no family history of breast or ovarian cancer6\par active medical issues or diabetes, hypertension and hypercholesterolemia\par She has a history of a intermittently elevated calcium and saw endocrinologist 2 years ago (calcium elevated in [place])\par Her last bone density was 2 years ago and she was told was normal

## 2020-12-15 PROBLEM — J06.9 ACUTE URI: Status: RESOLVED | Noted: 2017-05-22 | Resolved: 2020-12-15

## 2021-02-24 ENCOUNTER — OUTPATIENT (OUTPATIENT)
Dept: OUTPATIENT SERVICES | Facility: HOSPITAL | Age: 66
LOS: 1 days | Discharge: HOME | End: 2021-02-24

## 2021-02-24 DIAGNOSIS — Z98.89 OTHER SPECIFIED POSTPROCEDURAL STATES: Chronic | ICD-10-CM

## 2021-02-24 DIAGNOSIS — D05.10 INTRADUCTAL CARCINOMA IN SITU OF UNSPECIFIED BREAST: ICD-10-CM

## 2021-02-24 DIAGNOSIS — Z90.12 ACQUIRED ABSENCE OF LEFT BREAST AND NIPPLE: Chronic | ICD-10-CM

## 2021-02-24 DIAGNOSIS — Z90.710 ACQUIRED ABSENCE OF BOTH CERVIX AND UTERUS: Chronic | ICD-10-CM

## 2021-02-26 ENCOUNTER — RESULT REVIEW (OUTPATIENT)
Age: 66
End: 2021-02-26

## 2021-02-26 ENCOUNTER — APPOINTMENT (OUTPATIENT)
Dept: HEMATOLOGY ONCOLOGY | Facility: CLINIC | Age: 66
End: 2021-02-26
Payer: MEDICARE

## 2021-02-26 VITALS
TEMPERATURE: 97.9 F | WEIGHT: 199.5 LBS | RESPIRATION RATE: 18 BRPM | SYSTOLIC BLOOD PRESSURE: 150 MMHG | HEART RATE: 84 BPM | HEIGHT: 59.65 IN | DIASTOLIC BLOOD PRESSURE: 85 MMHG | OXYGEN SATURATION: 100 % | BODY MASS INDEX: 39.17 KG/M2

## 2021-02-26 LAB
BASOPHILS # BLD AUTO: 0.05 K/UL — SIGNIFICANT CHANGE UP (ref 0–0.2)
BASOPHILS NFR BLD AUTO: 0.6 % — SIGNIFICANT CHANGE UP (ref 0–2)
EOSINOPHIL # BLD AUTO: 0.26 K/UL — SIGNIFICANT CHANGE UP (ref 0–0.5)
EOSINOPHIL NFR BLD AUTO: 2.9 % — SIGNIFICANT CHANGE UP (ref 0–6)
HCT VFR BLD CALC: 38.2 % — SIGNIFICANT CHANGE UP (ref 34.5–45)
HGB BLD-MCNC: 12.2 G/DL — SIGNIFICANT CHANGE UP (ref 11.5–15.5)
IMM GRANULOCYTES NFR BLD AUTO: 0.6 % — SIGNIFICANT CHANGE UP (ref 0–1.5)
LYMPHOCYTES # BLD AUTO: 2.51 K/UL — SIGNIFICANT CHANGE UP (ref 1–3.3)
LYMPHOCYTES # BLD AUTO: 28.3 % — SIGNIFICANT CHANGE UP (ref 13–44)
MCHC RBC-ENTMCNC: 26.2 PG — LOW (ref 27–34)
MCHC RBC-ENTMCNC: 31.9 G/DL — LOW (ref 32–36)
MCV RBC AUTO: 82 FL — SIGNIFICANT CHANGE UP (ref 80–100)
MONOCYTES # BLD AUTO: 0.67 K/UL — SIGNIFICANT CHANGE UP (ref 0–0.9)
MONOCYTES NFR BLD AUTO: 7.6 % — SIGNIFICANT CHANGE UP (ref 2–14)
NEUTROPHILS # BLD AUTO: 5.33 K/UL — SIGNIFICANT CHANGE UP (ref 1.8–7.4)
NEUTROPHILS NFR BLD AUTO: 60 % — SIGNIFICANT CHANGE UP (ref 43–77)
NRBC # BLD: 0 /100 WBCS — SIGNIFICANT CHANGE UP (ref 0–0)
PLATELET # BLD AUTO: 259 K/UL — SIGNIFICANT CHANGE UP (ref 150–400)
RBC # BLD: 4.66 M/UL — SIGNIFICANT CHANGE UP (ref 3.8–5.2)
RBC # FLD: 15.9 % — HIGH (ref 10.3–14.5)
WBC # BLD: 8.87 K/UL — SIGNIFICANT CHANGE UP (ref 3.8–10.5)
WBC # FLD AUTO: 8.87 K/UL — SIGNIFICANT CHANGE UP (ref 3.8–10.5)

## 2021-02-26 PROCEDURE — 99214 OFFICE O/P EST MOD 30 MIN: CPT

## 2021-02-26 PROCEDURE — 99072 ADDL SUPL MATRL&STAF TM PHE: CPT

## 2021-02-26 NOTE — ASSESSMENT
[FreeTextEntry1] : Ms. BRENDAN CRAVEN  is a 64/f dx with left breast high grade DCIS (ER 90, [place] 10) in 7/ 2016 and is on treatment with Anastrozole since 12/2016. She is s/p left mastectomy 11/2016. \par 1. Breast ca- Tolerating Anastrozole well, good compliance. She has mild side effects from the treatment. Continue treatment x 5 yrs ( 11/2021). Annual breast imaging reviewed.\par 2. Osteopenia: concern for worsening bone density and fractures due to anastrozole. Rec to  continue calcium and vit D. DEXA 1-2 yrs. \par 3. Low Vitamin D: concern for worsening bone loss due to anastrozole and low vit D. Continue suppls. check level today\par 4. High cholesterol: concern for worsening cholesterol due to anastrozole. Pt is on pravastatin. Lipid profile annually. Life style modifications reviewed\par 5. Mild hot flashes: 2/2 anastrozole. Advised to wear layers and use fan prn. Consider Effexor if get worse.\par 6. hyperparathyroid: s/p surgery. F/B ENDO AND SURGERY\par \par RTC 6 m

## 2021-02-26 NOTE — PHYSICAL EXAM
[Fully active, able to carry on all pre-disease performance without restriction] : Status 0 - Fully active, able to carry on all pre-disease performance without restriction [Obese] : obese [Normal] : affect appropriate [de-identified] : left mastectomy with reconstruction. No chest wall or axillary nodules. Right breast exam normal  [de-identified] : scar from appendectomy and LINDA flap- healed well [de-identified] : psoriasis patches on BL elbow

## 2021-02-26 NOTE — HISTORY OF PRESENT ILLNESS
[de-identified] : Ms. Soto is being referred by Dr. Cirilo Foy for evaluation  for any additional therapy after her left mastectomy for DCIS.\par She was in her usual state of health until she had a screening mammography on . Calcifications were noted in the left central posterior breast she was brought back for a digital mammogram which revealed calcifications in the central posterior left breast  which was new from prior examination.  Stereotactic biopsy revealed ductal carcinoma in situ, solid pattern with high nuclear grade. There were microcalcifications present in the DCIS as well as benign tissue estrogen receptor was seen in 95% of nuclei and progesterone receptor and 10%.\par Bilateral breast MRI was performed on September 3 there was enhancement in the area of the prior biopsy of note there was stable benign mass enhancement of the lateral breast located lateral and superior to the biopsy-proven malignancy an additional biopsy was recommended\par A left lateral stereotactic core biopsy showed a tiny focus of atypical ductal hyperplasia and left medial core biopsy revealed atypical ductal hyperplasia.\par On  she had a left mastectomy  there was ductal carcinoma in situ, 7 mm in maximum dimension   cribriform and micropapillary and nuclear grade 2; one sentinel lymph node was negative and one left internal mammary node was negative\par A few years ago she had lancing of a cyst of some sort  on the superficial aspect of her right breast\par She gets regular mammograms. She has no history of hormonal exposure she had hysterectomy and salpingo-oophorectomy at age 46.\par She had 3 pregnancies;  one child  at 6 months with chromosomal abnormalities the other 2 children are in good health there is no family history of breast or ovarian cancer6\par active medical issues or diabetes, hypertension and hypercholesterolemia\par She has a history of a intermittently elevated calcium and saw endocrinologist 2 years ago (calcium elevated in [place])\par Her last bone density was 2 years ago and she was told was normal [de-identified] : Ms. BRENDAN CRAVEN  is here for a follow up appt for left breast high grade DCIS (ER 90, [place] 10) in 7/ 2016 and is on treatment with Anastrozole since 12/2016. Tx care from Dr DONENLLY in 8/2019. \par Takes Anastrozole daily, good compliance. She has mild hot flashes. She has shoulder tendonitis ( confirmed on MRI)  and takes tylenol PRN. Reminded to see ortho for PT/injection. Pain hasn’t gotten worse with anastrozole. She denies vag dryness, excessive fatigue, GI s/e, hair loss. She is active, no change in energy, wt or appetite. \par She was dx with COVID in 3/2020, stayed home and recovered completely. Gained 3 lbs since last visit. She is staying active, walking and watching her diet.\par mammogram 5/2020   BIRDAS 1\par DEXA 4/2019 - osteopenia:  She takes vit D. She is f/b endo for hypercal. She recently had parathyroid surgery.\par CT C/A/P was negative for acute issues or malignancy 7/2019 (done due to cardiac reasons). she is f/b cardio\par CT 6/2020: stable lung nodules (POST COVID)

## 2021-03-02 LAB
25(OH)D3 SERPL-MCNC: 40.5 NG/ML
ALBUMIN SERPL ELPH-MCNC: 4.5 G/DL
ALP BLD-CCNC: 85 U/L
ALT SERPL-CCNC: 17 U/L
ANION GAP SERPL CALC-SCNC: 15 MMOL/L
AST SERPL-CCNC: 16 U/L
BILIRUB SERPL-MCNC: <0.2 MG/DL
BUN SERPL-MCNC: 15 MG/DL
CALCIUM SERPL-MCNC: 10.1 MG/DL
CHLORIDE SERPL-SCNC: 104 MMOL/L
CO2 SERPL-SCNC: 21 MMOL/L
CREAT SERPL-MCNC: 0.78 MG/DL
GLUCOSE SERPL-MCNC: 137 MG/DL
POTASSIUM SERPL-SCNC: 4.5 MMOL/L
PROT SERPL-MCNC: 7.1 G/DL
SODIUM SERPL-SCNC: 140 MMOL/L

## 2021-03-10 ENCOUNTER — NON-APPOINTMENT (OUTPATIENT)
Age: 66
End: 2021-03-10

## 2021-03-10 ENCOUNTER — APPOINTMENT (OUTPATIENT)
Dept: CARDIOLOGY | Facility: CLINIC | Age: 66
End: 2021-03-10
Payer: MEDICARE

## 2021-03-10 VITALS
DIASTOLIC BLOOD PRESSURE: 84 MMHG | HEIGHT: 59 IN | BODY MASS INDEX: 39.31 KG/M2 | OXYGEN SATURATION: 98 % | HEART RATE: 79 BPM | SYSTOLIC BLOOD PRESSURE: 127 MMHG | WEIGHT: 195 LBS

## 2021-03-10 PROCEDURE — 93000 ELECTROCARDIOGRAM COMPLETE: CPT

## 2021-03-10 PROCEDURE — 99072 ADDL SUPL MATRL&STAF TM PHE: CPT

## 2021-03-10 PROCEDURE — 99204 OFFICE O/P NEW MOD 45 MIN: CPT

## 2021-03-10 RX ORDER — RANITIDINE 300 MG/1
300 TABLET ORAL
Qty: 90 | Refills: 1 | Status: DISCONTINUED | COMMUNITY
Start: 2017-05-22 | End: 2021-03-10

## 2021-03-10 RX ORDER — BLOOD-GLUCOSE METER
W/DEVICE EACH MISCELLANEOUS
Qty: 1 | Refills: 0 | Status: DISCONTINUED | COMMUNITY
Start: 2017-03-21 | End: 2021-03-10

## 2021-03-10 RX ORDER — LANCETS
EACH MISCELLANEOUS
Qty: 120 | Refills: 5 | Status: DISCONTINUED | COMMUNITY
Start: 2017-03-21 | End: 2021-03-10

## 2021-03-10 RX ORDER — BLOOD SUGAR DIAGNOSTIC
STRIP MISCELLANEOUS
Qty: 2 | Refills: 5 | Status: DISCONTINUED | COMMUNITY
Start: 2017-03-21 | End: 2021-03-10

## 2021-03-10 RX ORDER — BLOOD-GLUCOSE METER
KIT MISCELLANEOUS
Qty: 1 | Refills: 0 | Status: DISCONTINUED | COMMUNITY
Start: 2017-03-21 | End: 2021-03-10

## 2021-03-10 RX ORDER — FAMOTIDINE 40 MG/1
40 TABLET, FILM COATED ORAL
Qty: 30 | Refills: 0 | Status: DISCONTINUED | COMMUNITY
Start: 2020-06-03 | End: 2021-03-10

## 2021-03-16 ENCOUNTER — RESULT REVIEW (OUTPATIENT)
Age: 66
End: 2021-03-16

## 2021-03-16 ENCOUNTER — APPOINTMENT (OUTPATIENT)
Dept: HEMATOLOGY ONCOLOGY | Facility: CLINIC | Age: 66
End: 2021-03-16

## 2021-03-16 ENCOUNTER — LABORATORY RESULT (OUTPATIENT)
Age: 66
End: 2021-03-16

## 2021-03-16 LAB
BASOPHILS # BLD AUTO: 0.05 K/UL — SIGNIFICANT CHANGE UP (ref 0–0.2)
BASOPHILS NFR BLD AUTO: 0.6 % — SIGNIFICANT CHANGE UP (ref 0–2)
EOSINOPHIL # BLD AUTO: 0.32 K/UL — SIGNIFICANT CHANGE UP (ref 0–0.5)
EOSINOPHIL NFR BLD AUTO: 3.9 % — SIGNIFICANT CHANGE UP (ref 0–6)
HCT VFR BLD CALC: 40.1 % — SIGNIFICANT CHANGE UP (ref 34.5–45)
HGB BLD-MCNC: 12.9 G/DL — SIGNIFICANT CHANGE UP (ref 11.5–15.5)
IMM GRANULOCYTES NFR BLD AUTO: 0.6 % — SIGNIFICANT CHANGE UP (ref 0–1.5)
LYMPHOCYTES # BLD AUTO: 2.4 K/UL — SIGNIFICANT CHANGE UP (ref 1–3.3)
LYMPHOCYTES # BLD AUTO: 29.4 % — SIGNIFICANT CHANGE UP (ref 13–44)
MCHC RBC-ENTMCNC: 26.3 PG — LOW (ref 27–34)
MCHC RBC-ENTMCNC: 32.2 G/DL — SIGNIFICANT CHANGE UP (ref 32–36)
MCV RBC AUTO: 81.8 FL — SIGNIFICANT CHANGE UP (ref 80–100)
MONOCYTES # BLD AUTO: 0.53 K/UL — SIGNIFICANT CHANGE UP (ref 0–0.9)
MONOCYTES NFR BLD AUTO: 6.5 % — SIGNIFICANT CHANGE UP (ref 2–14)
NEUTROPHILS # BLD AUTO: 4.8 K/UL — SIGNIFICANT CHANGE UP (ref 1.8–7.4)
NEUTROPHILS NFR BLD AUTO: 59 % — SIGNIFICANT CHANGE UP (ref 43–77)
NRBC # BLD: 0 /100 WBCS — SIGNIFICANT CHANGE UP (ref 0–0)
PLATELET # BLD AUTO: 209 K/UL — SIGNIFICANT CHANGE UP (ref 150–400)
RBC # BLD: 4.9 M/UL — SIGNIFICANT CHANGE UP (ref 3.8–5.2)
RBC # FLD: 15.9 % — HIGH (ref 10.3–14.5)
WBC # BLD: 8.15 K/UL — SIGNIFICANT CHANGE UP (ref 3.8–10.5)
WBC # FLD AUTO: 8.15 K/UL — SIGNIFICANT CHANGE UP (ref 3.8–10.5)

## 2021-03-21 NOTE — ASSESSMENT
[FreeTextEntry1] : 66-year-old female with a history of left-sided breast cancer status postmastectomy currently on anastrozole.  Presents for cardio oncology evaluation and with stratification for cardiac disease.  Concern for elevated lipids on anastrozole.  I have discussed the risk of increased cholesterol with anastrozole.  Will obtain a lipid profile continue with current dose of Pravachol.  Blood pressure is controlled on current medication of amlodipine, losartan, metoprolol.  Treatment with Metformin for diabetes.\par \par Follow-up in 6 months

## 2021-03-21 NOTE — HISTORY OF PRESENT ILLNESS
[FreeTextEntry1] : 66-year-old female with a history of left breast cancer ER positive [place] positive.  Status post mastectomy.  On anastrozole.  Cholesterol high concern for worsening due to anastrozole.\par Presents for cardiac evaluation and was stratification.\par \par ROS: Denies Chest pain, dyspnea, palpitations, dizziness or syncope.\par \par \par \par \par

## 2021-05-19 ENCOUNTER — RESULT REVIEW (OUTPATIENT)
Age: 66
End: 2021-05-19

## 2021-05-19 ENCOUNTER — OUTPATIENT (OUTPATIENT)
Dept: OUTPATIENT SERVICES | Facility: HOSPITAL | Age: 66
LOS: 1 days | End: 2021-05-19
Payer: COMMERCIAL

## 2021-05-19 ENCOUNTER — APPOINTMENT (OUTPATIENT)
Dept: RADIOLOGY | Facility: IMAGING CENTER | Age: 66
End: 2021-05-19
Payer: MEDICARE

## 2021-05-19 ENCOUNTER — APPOINTMENT (OUTPATIENT)
Dept: MAMMOGRAPHY | Facility: IMAGING CENTER | Age: 66
End: 2021-05-19
Payer: MEDICARE

## 2021-05-19 DIAGNOSIS — D05.10 INTRADUCTAL CARCINOMA IN SITU OF UNSPECIFIED BREAST: ICD-10-CM

## 2021-05-19 DIAGNOSIS — Z90.12 ACQUIRED ABSENCE OF LEFT BREAST AND NIPPLE: Chronic | ICD-10-CM

## 2021-05-19 DIAGNOSIS — Z98.89 OTHER SPECIFIED POSTPROCEDURAL STATES: Chronic | ICD-10-CM

## 2021-05-19 DIAGNOSIS — Z00.00 ENCOUNTER FOR GENERAL ADULT MEDICAL EXAMINATION WITHOUT ABNORMAL FINDINGS: ICD-10-CM

## 2021-05-19 DIAGNOSIS — Z90.710 ACQUIRED ABSENCE OF BOTH CERVIX AND UTERUS: Chronic | ICD-10-CM

## 2021-05-19 PROCEDURE — G0279: CPT | Mod: 26

## 2021-05-19 PROCEDURE — 76642 ULTRASOUND BREAST LIMITED: CPT | Mod: 26,RT

## 2021-05-19 PROCEDURE — 76642 ULTRASOUND BREAST LIMITED: CPT

## 2021-05-19 PROCEDURE — 77080 DXA BONE DENSITY AXIAL: CPT | Mod: 26

## 2021-05-19 PROCEDURE — G0279: CPT

## 2021-05-19 PROCEDURE — 77065 DX MAMMO INCL CAD UNI: CPT | Mod: 26,RT

## 2021-05-19 PROCEDURE — 77065 DX MAMMO INCL CAD UNI: CPT

## 2021-05-19 PROCEDURE — 77080 DXA BONE DENSITY AXIAL: CPT

## 2021-05-25 ENCOUNTER — APPOINTMENT (OUTPATIENT)
Dept: SURGICAL ONCOLOGY | Facility: CLINIC | Age: 66
End: 2021-05-25
Payer: MEDICARE

## 2021-05-25 VITALS
OXYGEN SATURATION: 97 % | WEIGHT: 190 LBS | RESPIRATION RATE: 16 BRPM | HEART RATE: 68 BPM | BODY MASS INDEX: 37.3 KG/M2 | HEIGHT: 60 IN | DIASTOLIC BLOOD PRESSURE: 81 MMHG | SYSTOLIC BLOOD PRESSURE: 121 MMHG

## 2021-05-25 PROCEDURE — 99213 OFFICE O/P EST LOW 20 MIN: CPT

## 2021-05-27 NOTE — HISTORY OF PRESENT ILLNESS
[de-identified] : Clara is a 62 year old female presents for a breast cancer follow up visit. She is s/p left mastectomy, left axillary sentinel & non sentinel lymph node biopsy with LINDA reconstruction by Dr. Thomas in November 2016. FInal pathology revealed a 7 mm DCIS, cribriform, micropapillary type, intermediate grade, no necrosis, negative lymph nodes, ER/MA(+). Tumor staging pTis pN0. She is currently on Anastrozole under the supervision of Dr. Elkins, tolerating well. \par \par 02/13/2018:  Patient presents for 6 months follow up.  She continues on Anastrazole for which she is tolerating.  She has occasional hot flashes but denies joint aches.  She denies new nodularity in the reconstructed left breast and offers no symptoms on the right breast.  Patient last underwent right breast mammogram and breast ultrasound 09/15/2017 which revealed 4 subcentimeter circumscribed masses in the medial right breast without ultrasound correlate - BIRADS 3.  She is due for 6 months follow up right mammogram in 03/2018.\par \par 10/09/2018:  Patient's 6 months follow up right mammogram 03/2018 - BI-RADS 2.  Offers no new complaints.  Was treated recently for Helicobacter Pylori and UTI.\par \par 04/09/2019:  Presenting for 6 months follow up.  Patient feels well and offers no new complaints.  She denies right breast pain, mass or skin thickening.   She had annual right mammogram 03/30/3019 - BI-RADS 1.\par \par 12/03/2019: Patient presents for 6 months follow up.  She reports a recurrent draining cyst of the inner right breast.  She denies other breast related issues.  She is due for annual mammogram 03/2020.\par \par 12/17/2019: Patient presents for excision of right breast inner cutaneous cyst.  Please see procedure note below.\par \par 12/24/2019: Doing well after excision.  Denies pain.  Pathology pending at this time.\par \par 06/30/2020: Doing well.  Recovered from COVID-19.  Denies breast related symptoms.  Right mammogram 05/2020 - BI-RADS 2.\par \par 05/25/2021: Patient is overall doing well.  Recent right breast imaging 05/19/2021 shows benign findings - BI-RADS 2.  She denies breast related complaints but is unhappy with breast asymmetry from reconstruction and asymmetry of lower abdomen from abdominoplasty.

## 2021-05-27 NOTE — ASSESSMENT
[FreeTextEntry1] : LOR from left breast DCIS.\par Continue hormonal treatment.\par Follow up right breast imaging in one year.\par Referral to Dr. Ángel Glez, plastic surgery, for evaluation of reconstruction/revision options.\par Follow up in one year.

## 2021-08-10 ENCOUNTER — FORM ENCOUNTER (OUTPATIENT)
Age: 66
End: 2021-08-10

## 2021-08-11 ENCOUNTER — APPOINTMENT (OUTPATIENT)
Dept: CARDIOLOGY | Facility: CLINIC | Age: 66
End: 2021-08-11
Payer: MEDICARE

## 2021-08-11 ENCOUNTER — APPOINTMENT (OUTPATIENT)
Dept: ELECTROPHYSIOLOGY | Facility: CLINIC | Age: 66
End: 2021-08-11
Payer: MEDICARE

## 2021-08-11 VITALS
BODY MASS INDEX: 17.67 KG/M2 | HEIGHT: 60 IN | WEIGHT: 90 LBS | HEART RATE: 70 BPM | OXYGEN SATURATION: 97 % | SYSTOLIC BLOOD PRESSURE: 128 MMHG | DIASTOLIC BLOOD PRESSURE: 83 MMHG

## 2021-08-11 DIAGNOSIS — Z86.39 PERSONAL HISTORY OF OTHER ENDOCRINE, NUTRITIONAL AND METABOLIC DISEASE: ICD-10-CM

## 2021-08-11 PROCEDURE — 99214 OFFICE O/P EST MOD 30 MIN: CPT

## 2021-08-12 NOTE — PHYSICAL EXAM
[Rhythm Regular] : regular [Normal S1] : normal S1 [Normal S2] : normal S2 [2+] : left 2+ [Normal] : no edema, no cyanosis, no clubbing, no varicosities [de-identified] : obese BMI 37.11 [de-identified] : s/p abdominoplasty

## 2021-08-12 NOTE — ASSESSMENT
[FreeTextEntry1] : 66 year old female, obese with history of hypertension, hyperlipidemia, type 2 diabetes,  GERD, s/p parathyroidectomy,  and breast cancer (currently on Anastrozole) and intermittent SVT.\par \par #Hypertension\par   Blood pressure today in good control: 128/83\par   Continue on Amlodipine, Losartan and Aldactone \par   \par \par #Hyperlipemia\par   Continue on Pravachol 40 mg QHS\par   LDL 89 mg/dL-> March (2021)\par \par #Type 2 diabetes\par   Continue on Metformin 1000 mg BID\par \par # Palpitations\par    Placed a Zio AT patch for 4 days to reassess palpitations\par \par # Hx of breast cancer\par    Patient is dissatisfied with her breast reconstruction and abdominal surgery.\par    She plans to have another plastic surgeon assess her abdominal structure and may consider repeating surgery \par    for repair\par    Anastrozole 1 mg daily\par \par Patient to have repeat echo and stress testing prior to consideration for abdominal surgery. \par \par   \par

## 2021-08-12 NOTE — HISTORY OF PRESENT ILLNESS
[FreeTextEntry1] : 66- year-old female with a history of left breast cancer ER positive [place] positive.  Status post mastectomy.  On anastrozole.  Cholesterol high concern for worsening due to anastrozole.\par Presents for cardiac evaluation and was stratification.\par \par ROS: Denies Chest pain, dyspnea, palpitations, dizziness or syncope.\par \par \par Interval Note\par August 11, 2021\par \par 66 year old female, obese with history as outlined above:\par \par Hypertension, hyperlipidemia, type 2 diabetes,  GERD, s/p parathyroidectomy,  and breast cancer and intermittent SVT associated with shortness of breath and chest discomfort. \par \par S/P left mastectomy, left axillary sentinel and non sentinel lymph node biopsy with LINDA reconstruction in November of November 2016.\par Final pathology revealed a 7 mm DCIS, cribform, micropapillary tpe\par intermediate grade, negative lymph nodes\par ER/UT (+)\par Tumor staging pTISpNO\par \par Abdominal revision- plastic surgery in January of 2017.\par \par Currently taking Anastrozole: started in 2017 and to be completed in 2022\par \par In June of 2020,patient recovered from the COVID- 19 virus.\par Currently vaccinated Moderna: 2nd dose April 1, 2021\par \par Blood pressure today is in excellent control. EKG is stable and unchanged. \par Patient reports some ongoing fatigue and  palpitations that are occurring 2-3 times per day for "brief" moments. \par \par \par \par \par \par \par \par \par \par

## 2021-08-16 ENCOUNTER — APPOINTMENT (OUTPATIENT)
Dept: PLASTIC SURGERY | Facility: CLINIC | Age: 66
End: 2021-08-16
Payer: MEDICARE

## 2021-08-16 VITALS
OXYGEN SATURATION: 98 % | WEIGHT: 189.31 LBS | TEMPERATURE: 97.8 F | HEART RATE: 67 BPM | HEIGHT: 60 IN | DIASTOLIC BLOOD PRESSURE: 82 MMHG | BODY MASS INDEX: 37.17 KG/M2 | SYSTOLIC BLOOD PRESSURE: 124 MMHG

## 2021-08-16 PROCEDURE — 99213 OFFICE O/P EST LOW 20 MIN: CPT

## 2021-08-17 ENCOUNTER — OUTPATIENT (OUTPATIENT)
Dept: OUTPATIENT SERVICES | Facility: HOSPITAL | Age: 66
LOS: 1 days | Discharge: HOME | End: 2021-08-17

## 2021-08-17 DIAGNOSIS — Z90.710 ACQUIRED ABSENCE OF BOTH CERVIX AND UTERUS: Chronic | ICD-10-CM

## 2021-08-17 DIAGNOSIS — Z98.89 OTHER SPECIFIED POSTPROCEDURAL STATES: Chronic | ICD-10-CM

## 2021-08-17 DIAGNOSIS — Z90.12 ACQUIRED ABSENCE OF LEFT BREAST AND NIPPLE: Chronic | ICD-10-CM

## 2021-08-17 DIAGNOSIS — D05.10 INTRADUCTAL CARCINOMA IN SITU OF UNSPECIFIED BREAST: ICD-10-CM

## 2021-08-18 ENCOUNTER — RESULT REVIEW (OUTPATIENT)
Age: 66
End: 2021-08-18

## 2021-08-18 ENCOUNTER — APPOINTMENT (OUTPATIENT)
Dept: HEMATOLOGY ONCOLOGY | Facility: CLINIC | Age: 66
End: 2021-08-18
Payer: MEDICARE

## 2021-08-18 VITALS
RESPIRATION RATE: 16 BRPM | DIASTOLIC BLOOD PRESSURE: 89 MMHG | SYSTOLIC BLOOD PRESSURE: 150 MMHG | TEMPERATURE: 97.2 F | OXYGEN SATURATION: 96 % | WEIGHT: 192.88 LBS | HEIGHT: 60 IN | BODY MASS INDEX: 37.87 KG/M2 | HEART RATE: 85 BPM

## 2021-08-18 LAB
BASOPHILS # BLD AUTO: 0.05 K/UL — SIGNIFICANT CHANGE UP (ref 0–0.2)
BASOPHILS NFR BLD AUTO: 0.6 % — SIGNIFICANT CHANGE UP (ref 0–2)
EOSINOPHIL # BLD AUTO: 0.16 K/UL — SIGNIFICANT CHANGE UP (ref 0–0.5)
EOSINOPHIL NFR BLD AUTO: 1.9 % — SIGNIFICANT CHANGE UP (ref 0–6)
HCT VFR BLD CALC: 38.8 % — SIGNIFICANT CHANGE UP (ref 34.5–45)
HGB BLD-MCNC: 12.2 G/DL — SIGNIFICANT CHANGE UP (ref 11.5–15.5)
IMM GRANULOCYTES NFR BLD AUTO: 0.6 % — SIGNIFICANT CHANGE UP (ref 0–1.5)
LYMPHOCYTES # BLD AUTO: 2.57 K/UL — SIGNIFICANT CHANGE UP (ref 1–3.3)
LYMPHOCYTES # BLD AUTO: 30.7 % — SIGNIFICANT CHANGE UP (ref 13–44)
MCHC RBC-ENTMCNC: 25.9 PG — LOW (ref 27–34)
MCHC RBC-ENTMCNC: 31.4 G/DL — LOW (ref 32–36)
MCV RBC AUTO: 82.4 FL — SIGNIFICANT CHANGE UP (ref 80–100)
MONOCYTES # BLD AUTO: 0.64 K/UL — SIGNIFICANT CHANGE UP (ref 0–0.9)
MONOCYTES NFR BLD AUTO: 7.6 % — SIGNIFICANT CHANGE UP (ref 2–14)
NEUTROPHILS # BLD AUTO: 4.9 K/UL — SIGNIFICANT CHANGE UP (ref 1.8–7.4)
NEUTROPHILS NFR BLD AUTO: 58.6 % — SIGNIFICANT CHANGE UP (ref 43–77)
NRBC # BLD: 0 /100 WBCS — SIGNIFICANT CHANGE UP (ref 0–0)
PLATELET # BLD AUTO: 252 K/UL — SIGNIFICANT CHANGE UP (ref 150–400)
RBC # BLD: 4.71 M/UL — SIGNIFICANT CHANGE UP (ref 3.8–5.2)
RBC # FLD: 16.9 % — HIGH (ref 10.3–14.5)
WBC # BLD: 8.37 K/UL — SIGNIFICANT CHANGE UP (ref 3.8–10.5)
WBC # FLD AUTO: 8.37 K/UL — SIGNIFICANT CHANGE UP (ref 3.8–10.5)

## 2021-08-18 PROCEDURE — 99214 OFFICE O/P EST MOD 30 MIN: CPT

## 2021-08-18 NOTE — HISTORY OF PRESENT ILLNESS
[de-identified] : Ms. Soto is being referred by Dr. Cirilo Foy for evaluation  for any additional therapy after her left mastectomy for DCIS.\par She was in her usual state of health until she had a screening mammography on . Calcifications were noted in the left central posterior breast she was brought back for a digital mammogram which revealed calcifications in the central posterior left breast  which was new from prior examination.  Stereotactic biopsy revealed ductal carcinoma in situ, solid pattern with high nuclear grade. There were microcalcifications present in the DCIS as well as benign tissue estrogen receptor was seen in 95% of nuclei and progesterone receptor and 10%.\par Bilateral breast MRI was performed on September 3 there was enhancement in the area of the prior biopsy of note there was stable benign mass enhancement of the lateral breast located lateral and superior to the biopsy-proven malignancy an additional biopsy was recommended\par A left lateral stereotactic core biopsy showed a tiny focus of atypical ductal hyperplasia and left medial core biopsy revealed atypical ductal hyperplasia.\par On  she had a left mastectomy  there was ductal carcinoma in situ, 7 mm in maximum dimension   cribriform and micropapillary and nuclear grade 2; one sentinel lymph node was negative and one left internal mammary node was negative\par A few years ago she had lancing of a cyst of some sort  on the superficial aspect of her right breast\par She gets regular mammograms. She has no history of hormonal exposure she had hysterectomy and salpingo-oophorectomy at age 46.\par She had 3 pregnancies;  one child  at 6 months with chromosomal abnormalities the other 2 children are in good health there is no family history of breast or ovarian cancer6\par active medical issues or diabetes, hypertension and hypercholesterolemia\par She has a history of a intermittently elevated calcium and saw endocrinologist 2 years ago (calcium elevated in [place])\par Her last bone density was 2 years ago and she was told was normal [de-identified] : Ms. BRENDAN CRAVEN  is here for a follow up appt for left breast high grade DCIS (ER 90, [place] 10) in 7/ 2016 and is on treatment with Anastrozole since 12/2016. Tx care from Dr DONNELLY in 8/2019. \par Takes Anastrozole daily, good compliance. She has mild hot flashes. She has shoulder tendonitis ( confirmed on MRI)  and takes tylenol PRN. Reminded to see ortho for PT/injection. Pain hasn’t gotten worse with anastrozole. She denies vag dryness, excessive fatigue, GI s/e, hair loss. She is active, no change in energy, wt or appetite. \par She was dx with COVID in 3/2020, stayed home and recovered completely. vaccine 4/2021\par mammogram 5/2021   BIRDAS 1\par DEXA 4/2019, 5/2021: osteopenia:  She takes vit D. She is f/b endo for hypercal. She recently had parathyroid surgery.\par CT C/A/P was negative for acute issues or malignancy 7/2019 (done due to cardiac reasons). she is f/b cardio. CT 6/2020: stable lung nodules (POST COVID)\par SHE WILL COMPLETE 5 YRS OF ENDOCRINE THERAPY IN 12/2021

## 2021-08-18 NOTE — ASSESSMENT
[FreeTextEntry1] : Ms. BRENDAN CRAVEN  is a 64/f dx with left breast high grade DCIS (ER 90, [place] 10) in 7/ 2016 and is on treatment with Anastrozole since 12/2016. She is s/p left mastectomy 11/2016. \par \par 1. Breast ca- Tolerating Anastrozole well, good compliance. She has mild side effects from the treatment. Continue treatment x 5 yrs. SHE WILL COMPLETE 5 YRS OF ENDOCRINE THERAPY IN 12/2021 Annual breast imaging reviewed.\par 2. Osteopenia: concern for worsening bone density and fractures due to anastrozole. Rec to  continue calcium and vit D. DEXA 1-2 yrs. \par 3. Low Vitamin D: concern for worsening bone loss due to anastrozole and low vit D. Continue suppls. check level today\par 4. High cholesterol: concern for worsening cholesterol due to anastrozole. Pt is on pravastatin. Lipid profile annually. Life style modifications reviewed\par 5. Mild hot flashes: 2/2 anastrozole. Advised to wear layers and use fan prn. Consider Effexor if get worse.\par 6. hyperparathyroid: s/p surgery. F/B ENDO AND SURGERY\par \par RTC 12 M

## 2021-08-19 LAB
25(OH)D3 SERPL-MCNC: 41.4 NG/ML
ALBUMIN SERPL ELPH-MCNC: 4.8 G/DL
ALP BLD-CCNC: 84 U/L
ALT SERPL-CCNC: 16 U/L
ANION GAP SERPL CALC-SCNC: 16 MMOL/L
AST SERPL-CCNC: 16 U/L
BILIRUB SERPL-MCNC: 0.2 MG/DL
BUN SERPL-MCNC: 16 MG/DL
CALCIUM SERPL-MCNC: 10 MG/DL
CHLORIDE SERPL-SCNC: 105 MMOL/L
CO2 SERPL-SCNC: 19 MMOL/L
CREAT SERPL-MCNC: 0.86 MG/DL
GLUCOSE SERPL-MCNC: 107 MG/DL
POTASSIUM SERPL-SCNC: 4.7 MMOL/L
PROT SERPL-MCNC: 7.2 G/DL
SODIUM SERPL-SCNC: 140 MMOL/L

## 2021-08-25 ENCOUNTER — OUTPATIENT (OUTPATIENT)
Dept: OUTPATIENT SERVICES | Facility: HOSPITAL | Age: 66
LOS: 1 days | End: 2021-08-25
Payer: COMMERCIAL

## 2021-08-25 VITALS
SYSTOLIC BLOOD PRESSURE: 129 MMHG | HEART RATE: 81 BPM | HEIGHT: 61 IN | OXYGEN SATURATION: 96 % | WEIGHT: 190.92 LBS | TEMPERATURE: 97 F | RESPIRATION RATE: 18 BRPM | DIASTOLIC BLOOD PRESSURE: 76 MMHG

## 2021-08-25 DIAGNOSIS — Z98.890 OTHER SPECIFIED POSTPROCEDURAL STATES: Chronic | ICD-10-CM

## 2021-08-25 DIAGNOSIS — Z98.89 OTHER SPECIFIED POSTPROCEDURAL STATES: Chronic | ICD-10-CM

## 2021-08-25 DIAGNOSIS — D49.3 NEOPLASM OF UNSPECIFIED BEHAVIOR OF BREAST: ICD-10-CM

## 2021-08-25 DIAGNOSIS — E89.2 POSTPROCEDURAL HYPOPARATHYROIDISM: Chronic | ICD-10-CM

## 2021-08-25 DIAGNOSIS — Z90.710 ACQUIRED ABSENCE OF BOTH CERVIX AND UTERUS: Chronic | ICD-10-CM

## 2021-08-25 DIAGNOSIS — I10 ESSENTIAL (PRIMARY) HYPERTENSION: ICD-10-CM

## 2021-08-25 DIAGNOSIS — Z90.12 ACQUIRED ABSENCE OF LEFT BREAST AND NIPPLE: Chronic | ICD-10-CM

## 2021-08-25 DIAGNOSIS — Z01.818 ENCOUNTER FOR OTHER PREPROCEDURAL EXAMINATION: ICD-10-CM

## 2021-08-25 DIAGNOSIS — Z98.890 OTHER SPECIFIED POSTPROCEDURAL STATES: ICD-10-CM

## 2021-08-25 DIAGNOSIS — E11.9 TYPE 2 DIABETES MELLITUS WITHOUT COMPLICATIONS: ICD-10-CM

## 2021-08-25 LAB
A1C WITH ESTIMATED AVERAGE GLUCOSE RESULT: 7 % — HIGH (ref 4–5.6)
BLD GP AB SCN SERPL QL: NEGATIVE — SIGNIFICANT CHANGE UP
ESTIMATED AVERAGE GLUCOSE: 154 MG/DL — HIGH (ref 68–114)
RH IG SCN BLD-IMP: POSITIVE — SIGNIFICANT CHANGE UP

## 2021-08-25 PROCEDURE — 83036 HEMOGLOBIN GLYCOSYLATED A1C: CPT

## 2021-08-25 PROCEDURE — 86900 BLOOD TYPING SEROLOGIC ABO: CPT

## 2021-08-25 PROCEDURE — G0463: CPT

## 2021-08-25 PROCEDURE — 86850 RBC ANTIBODY SCREEN: CPT

## 2021-08-25 PROCEDURE — 86901 BLOOD TYPING SEROLOGIC RH(D): CPT

## 2021-08-25 RX ORDER — LIDOCAINE HCL 20 MG/ML
0.2 VIAL (ML) INJECTION ONCE
Refills: 0 | Status: DISCONTINUED | OUTPATIENT
Start: 2021-09-03 | End: 2021-09-17

## 2021-08-25 RX ORDER — ALLOPURINOL 300 MG
100 TABLET ORAL
Qty: 0 | Refills: 0 | DISCHARGE

## 2021-08-25 RX ORDER — SODIUM CHLORIDE 9 MG/ML
3 INJECTION INTRAMUSCULAR; INTRAVENOUS; SUBCUTANEOUS EVERY 8 HOURS
Refills: 0 | Status: DISCONTINUED | OUTPATIENT
Start: 2021-09-03 | End: 2021-09-17

## 2021-08-25 RX ORDER — NITROGLYCERIN 6.5 MG
0 CAPSULE, EXTENDED RELEASE ORAL
Qty: 0 | Refills: 0 | DISCHARGE

## 2021-08-25 NOTE — H&P PST ADULT - VASCULAR
Equal and normal pulses (carotid, femoral, dorsalis pedis) Consent: The patient's consent was obtained including but not limited to risks of crusting, scabbing, blistering, scarring, darker or lighter pigmentary change, recurrence, incomplete removal and infection. Render Post-Care Instructions In Note?: no Number Of Freeze-Thaw Cycles: 2 freeze-thaw cycles Detail Level: Simple Duration Of Freeze Thaw-Cycle (Seconds): 10 Post-Care Instructions: I reviewed with the patient in detail post-care instructions. Patient is to wear sunprotection, and avoid picking at any of the treated lesions. Pt may apply Vaseline to crusted or scabbing areas.

## 2021-08-25 NOTE — H&P PST ADULT - REASON FOR ADMISSION
"I had breast surgery with reconstruction 4 years ago and now one side of my abdomen is bigger than the other, and sometimes I have pain."

## 2021-08-25 NOTE — H&P PST ADULT - PROBLEM SELECTOR PLAN 1
scheduled for left breast reconstruction with liposuction and fat grafting/ revision abdominal scar  preop instruction discussed, and patient verbalized understanding   chlorhexidine wash given and advised to avoid elbows and lower back

## 2021-08-25 NOTE — H&P PST ADULT - NSICDXPASTMEDICALHX_GEN_ALL_CORE_FT
PAST MEDICAL HISTORY:  2019 novel coronavirus disease (COVID-19) April 2020  body aches and headache  treated from home    Acid reflux     Breast tumor malignant  left breast 08/2016    Gout last flare many years ago    H/O Helicobacter infection x one and half year ago   treated   and repeat test negative    History of angina had nitroglycerin at that time x 30 years ago, also had an angiogram (no intervention).    History of Helicobacter pylori infection tx 6/18 and 12/18 ; pt to f/u with GI " appt end 6/19"    Hypertension     Irritable bowel syndrome with diarrhea x 30 years    Lung nodule noted 2016 ; pt f/u with  repeat c/t scan yearly last 3/19    Osteopenia     Psoriasis Bilateral Elbows ; back    Type 2 diabetes mellitus last A1c 6.2   no daily fingerstick

## 2021-08-25 NOTE — H&P PST ADULT - HISTORY OF PRESENT ILLNESS
66 year old female with h/o HTN, dyslipidemia, T2DM, left breast malignancy s/p left mastectomy with Shelley flap, acid reflux, one time gout, previous h/o angina x 1 (30 years ago), obesity, previous covid-19 infection (mild symptoms, treated at home April 2020), mixed IBS, lung nodule unchanged x couple years, presents today for preop examination for scheduled left breast reconstruction with liposuction and fat grafting, and revision abdominal scar on 09/03/2021 with Dr. Thomas.  Scheduled for covid-19 PCR swab on 09/03/2021

## 2021-08-25 NOTE — H&P PST ADULT - NSICDXPASTSURGICALHX_GEN_ALL_CORE_FT
PAST SURGICAL HISTORY:  H/O total hysterectomy 2002  h/o HERBER/BSO    History of appendectomy     History of cholecystectomy     History of endoscopy both upper and lower endoscopy x one and half year ago    History of tonsillectomy     S/P left mastectomy Lymph Node Dissection ; Shelley flap . Pt reports Revision Flap 2 weeks post op secondary to bleeding    S/P parathyroidectomy x2 years ago

## 2021-08-26 ENCOUNTER — APPOINTMENT (OUTPATIENT)
Dept: CARDIOLOGY | Facility: CLINIC | Age: 66
End: 2021-08-26

## 2021-08-27 ENCOUNTER — APPOINTMENT (OUTPATIENT)
Dept: HEMATOLOGY ONCOLOGY | Facility: CLINIC | Age: 66
End: 2021-08-27

## 2021-08-27 PROBLEM — U07.1 COVID-19: Chronic | Status: ACTIVE | Noted: 2021-08-25

## 2021-08-27 PROBLEM — K21.9 GASTRO-ESOPHAGEAL REFLUX DISEASE WITHOUT ESOPHAGITIS: Chronic | Status: ACTIVE | Noted: 2021-08-25

## 2021-08-27 PROBLEM — Z86.19 PERSONAL HISTORY OF OTHER INFECTIOUS AND PARASITIC DISEASES: Chronic | Status: ACTIVE | Noted: 2021-08-25

## 2021-08-27 PROBLEM — Z86.79 PERSONAL HISTORY OF OTHER DISEASES OF THE CIRCULATORY SYSTEM: Chronic | Status: ACTIVE | Noted: 2019-06-10

## 2021-08-27 PROBLEM — E11.9 TYPE 2 DIABETES MELLITUS WITHOUT COMPLICATIONS: Chronic | Status: ACTIVE | Noted: 2021-08-25

## 2021-08-31 ENCOUNTER — OUTPATIENT (OUTPATIENT)
Dept: OUTPATIENT SERVICES | Facility: HOSPITAL | Age: 66
LOS: 1 days | End: 2021-08-31
Payer: COMMERCIAL

## 2021-08-31 DIAGNOSIS — Z90.12 ACQUIRED ABSENCE OF LEFT BREAST AND NIPPLE: Chronic | ICD-10-CM

## 2021-08-31 DIAGNOSIS — Z98.890 OTHER SPECIFIED POSTPROCEDURAL STATES: Chronic | ICD-10-CM

## 2021-08-31 DIAGNOSIS — Z98.89 OTHER SPECIFIED POSTPROCEDURAL STATES: Chronic | ICD-10-CM

## 2021-08-31 DIAGNOSIS — Z90.710 ACQUIRED ABSENCE OF BOTH CERVIX AND UTERUS: Chronic | ICD-10-CM

## 2021-08-31 DIAGNOSIS — E89.2 POSTPROCEDURAL HYPOPARATHYROIDISM: Chronic | ICD-10-CM

## 2021-08-31 DIAGNOSIS — Z11.52 ENCOUNTER FOR SCREENING FOR COVID-19: ICD-10-CM

## 2021-08-31 LAB — SARS-COV-2 RNA SPEC QL NAA+PROBE: SIGNIFICANT CHANGE UP

## 2021-08-31 PROCEDURE — C9803: CPT

## 2021-08-31 PROCEDURE — U0005: CPT

## 2021-08-31 PROCEDURE — U0003: CPT

## 2021-09-02 ENCOUNTER — TRANSCRIPTION ENCOUNTER (OUTPATIENT)
Age: 66
End: 2021-09-02

## 2021-09-02 PROCEDURE — 93244 EXT ECG>48HR<7D REV&INTERPJ: CPT

## 2021-09-03 ENCOUNTER — APPOINTMENT (OUTPATIENT)
Dept: PLASTIC SURGERY | Facility: HOSPITAL | Age: 66
End: 2021-09-03
Payer: MEDICARE

## 2021-09-03 ENCOUNTER — OUTPATIENT (OUTPATIENT)
Dept: OUTPATIENT SERVICES | Facility: HOSPITAL | Age: 66
LOS: 1 days | Discharge: HOME | End: 2021-09-03
Payer: COMMERCIAL

## 2021-09-03 VITALS
HEIGHT: 61 IN | TEMPERATURE: 97 F | RESPIRATION RATE: 16 BRPM | OXYGEN SATURATION: 98 % | SYSTOLIC BLOOD PRESSURE: 156 MMHG | DIASTOLIC BLOOD PRESSURE: 83 MMHG | WEIGHT: 190.92 LBS | HEART RATE: 56 BPM

## 2021-09-03 VITALS
HEART RATE: 74 BPM | RESPIRATION RATE: 16 BRPM | DIASTOLIC BLOOD PRESSURE: 92 MMHG | SYSTOLIC BLOOD PRESSURE: 149 MMHG | OXYGEN SATURATION: 98 %

## 2021-09-03 DIAGNOSIS — Z98.89 OTHER SPECIFIED POSTPROCEDURAL STATES: Chronic | ICD-10-CM

## 2021-09-03 DIAGNOSIS — E89.2 POSTPROCEDURAL HYPOPARATHYROIDISM: Chronic | ICD-10-CM

## 2021-09-03 DIAGNOSIS — Z90.710 ACQUIRED ABSENCE OF BOTH CERVIX AND UTERUS: Chronic | ICD-10-CM

## 2021-09-03 DIAGNOSIS — Z98.890 OTHER SPECIFIED POSTPROCEDURAL STATES: ICD-10-CM

## 2021-09-03 DIAGNOSIS — Z98.890 OTHER SPECIFIED POSTPROCEDURAL STATES: Chronic | ICD-10-CM

## 2021-09-03 DIAGNOSIS — Z90.12 ACQUIRED ABSENCE OF LEFT BREAST AND NIPPLE: Chronic | ICD-10-CM

## 2021-09-03 LAB
GLUCOSE BLDC GLUCOMTR-MCNC: 148 MG/DL — HIGH (ref 70–99)
RH IG SCN BLD-IMP: POSITIVE — SIGNIFICANT CHANGE UP

## 2021-09-03 PROCEDURE — 82962 GLUCOSE BLOOD TEST: CPT

## 2021-09-03 PROCEDURE — 15830 EXC EXCESSIVE SKIN ABDOMEN: CPT

## 2021-09-03 RX ORDER — FENTANYL CITRATE 50 UG/ML
25 INJECTION INTRAVENOUS
Refills: 0 | Status: DISCONTINUED | OUTPATIENT
Start: 2021-09-03 | End: 2021-09-03

## 2021-09-03 RX ORDER — APREPITANT 80 MG/1
40 CAPSULE ORAL ONCE
Refills: 0 | Status: COMPLETED | OUTPATIENT
Start: 2021-09-03 | End: 2021-09-03

## 2021-09-03 RX ORDER — AZTREONAM 2 G
1 VIAL (EA) INJECTION
Qty: 14 | Refills: 0
Start: 2021-09-03 | End: 2021-09-09

## 2021-09-03 RX ORDER — CHLORHEXIDINE GLUCONATE 213 G/1000ML
1 SOLUTION TOPICAL ONCE
Refills: 0 | Status: COMPLETED | OUTPATIENT
Start: 2021-09-03 | End: 2021-09-03

## 2021-09-03 RX ORDER — ASPIRIN/CALCIUM CARB/MAGNESIUM 324 MG
1 TABLET ORAL
Qty: 0 | Refills: 0 | DISCHARGE

## 2021-09-03 RX ORDER — CEFAZOLIN SODIUM 1 G
2000 VIAL (EA) INJECTION ONCE
Refills: 0 | Status: COMPLETED | OUTPATIENT
Start: 2021-09-03 | End: 2021-09-03

## 2021-09-03 RX ORDER — OXYCODONE HYDROCHLORIDE 5 MG/1
1 TABLET ORAL
Qty: 10 | Refills: 0
Start: 2021-09-03 | End: 2021-09-05

## 2021-09-03 RX ORDER — CHOLECALCIFEROL (VITAMIN D3) 125 MCG
1 CAPSULE ORAL
Qty: 0 | Refills: 0 | DISCHARGE

## 2021-09-03 RX ORDER — LOSARTAN POTASSIUM 100 MG/1
1 TABLET, FILM COATED ORAL
Qty: 0 | Refills: 0 | DISCHARGE

## 2021-09-03 RX ORDER — SPIRONOLACTONE 25 MG/1
1 TABLET, FILM COATED ORAL
Qty: 0 | Refills: 0 | DISCHARGE

## 2021-09-03 RX ORDER — ONDANSETRON 8 MG/1
4 TABLET, FILM COATED ORAL ONCE
Refills: 0 | Status: DISCONTINUED | OUTPATIENT
Start: 2021-09-03 | End: 2021-09-17

## 2021-09-03 RX ORDER — OXYCODONE HYDROCHLORIDE 5 MG/1
5 TABLET ORAL ONCE
Refills: 0 | Status: DISCONTINUED | OUTPATIENT
Start: 2021-09-03 | End: 2021-09-03

## 2021-09-03 RX ORDER — OXYCODONE HYDROCHLORIDE 5 MG/1
10 TABLET ORAL ONCE
Refills: 0 | Status: DISCONTINUED | OUTPATIENT
Start: 2021-09-03 | End: 2021-09-03

## 2021-09-03 RX ORDER — AMLODIPINE BESYLATE 2.5 MG/1
1 TABLET ORAL
Qty: 0 | Refills: 0 | DISCHARGE

## 2021-09-03 RX ORDER — OMEPRAZOLE 10 MG/1
1 CAPSULE, DELAYED RELEASE ORAL
Qty: 0 | Refills: 0 | DISCHARGE

## 2021-09-03 RX ORDER — ANASTROZOLE 1 MG/1
1 TABLET ORAL
Qty: 0 | Refills: 0 | DISCHARGE

## 2021-09-03 RX ADMIN — CHLORHEXIDINE GLUCONATE 1 APPLICATION(S): 213 SOLUTION TOPICAL at 07:02

## 2021-09-03 RX ADMIN — APREPITANT 40 MILLIGRAM(S): 80 CAPSULE ORAL at 07:43

## 2021-09-03 NOTE — ASU DISCHARGE PLAN (ADULT/PEDIATRIC) - NURSING INSTRUCTIONS
You were given Tylenol during your visit, the next dose of Tylenol will be on or after ______4:00 PM_____ ,today/tonight and every 6 hours afterwards for pain management, do not take any Tylenol containing products until this time. Do not exceed more than 4000mg of Tylenol in one 24 hour setting.

## 2021-09-03 NOTE — ASU DISCHARGE PLAN (ADULT/PEDIATRIC) - ASU DC SPECIAL INSTRUCTIONSFT
WOUND CARE:  Please keep incisions clean and dry. Please do not Scrub or rub incisions. Do not use lotion or powder on incisions.   You will be discharged with HONORIO drains. You will need to empty them and record outputs accurately. This will be taught to you by the nursing staff. Please do not remove the HONORIO drains. They will be removed in the office. Please bring to the office accurate records of output.   BATHING: You may shower and/or sponge bathe starting tomorrow. Please do not submerge wound underwater. You may use warm soapy water in the shower and rinse, pat dry.  ACTIVITY: No heavy lifting or straining. Otherwise, you may return to your usual level of physical activity. If you are taking narcotic pain medication DO NOT drive a car, operate machinery or make important decisions.  DIET: Return to your usual diet.  NOTIFY YOUR SURGEON IF YOU HAVE: any bleeding that does not stop, any pus draining from your wound(s), any fever (over 100.4 F) persistent nausea/vomiting, or if your pain is not controlled on your discharge pain medications, unable to urinate.    Please follow up with Dr. Thomas within x1 week after discharge from the hospital. You may call [phone] to schedule an appointment.

## 2021-09-03 NOTE — ASU DISCHARGE PLAN (ADULT/PEDIATRIC) - CARE PROVIDER_API CALL
Fercho Thomas)  Plastic Surgery  [address] [place] [address], [address]  [place] [address]  Phone: [phone]  Fax: [phone]  Follow Up Time: 1 week

## 2021-09-07 ENCOUNTER — APPOINTMENT (OUTPATIENT)
Dept: NEUROLOGY | Facility: CLINIC | Age: 66
End: 2021-09-07

## 2021-09-07 NOTE — ASU DISCHARGE PLAN (ADULT/PEDIATRIC) - DO NOT DRIVE IF TAKING PAIN MEDICATION
ITP REFILL due by 10/21/2021. Please place order for ITP Rx.   Last medication ordered:  morphINE Sulfate (PF) 400 mg in Sodium Chloride 40 mL IT infusion    OV scheduled 10/14/2021 NULL

## 2021-09-09 ENCOUNTER — NON-APPOINTMENT (OUTPATIENT)
Age: 66
End: 2021-09-09

## 2021-09-09 ENCOUNTER — APPOINTMENT (OUTPATIENT)
Dept: PLASTIC SURGERY | Facility: CLINIC | Age: 66
End: 2021-09-09
Payer: MEDICARE

## 2021-09-09 PROCEDURE — 99024 POSTOP FOLLOW-UP VISIT: CPT

## 2021-09-09 NOTE — REASON FOR VISIT
[Post Op: _________] : a [unfilled] post op visit [Consultation] : a consultation visit [FreeTextEntry1] : pt last seen in 02/10/2017 s/p  left mastectomy with LINDA reconstruction. Pt s/p readmission for wound washout and closure and placement of drains DOS 01/03/2017. pt reports due family issues and covid she has not been able to follow up to discuss revision and next step for breast reconstruction. pt c/o left abdominal donor site discomfort and bulging.

## 2021-09-09 NOTE — PHYSICAL EXAM
[NI] : Normal [de-identified] : Abd healing well no sign of infection no erythema.  Will maintain drains.

## 2021-09-13 ENCOUNTER — APPOINTMENT (OUTPATIENT)
Dept: PLASTIC SURGERY | Facility: CLINIC | Age: 66
End: 2021-09-13
Payer: MEDICARE

## 2021-09-13 PROCEDURE — 99024 POSTOP FOLLOW-UP VISIT: CPT

## 2021-09-14 NOTE — REASON FOR VISIT
[Post Op: _________] : a [unfilled] post op visit [FreeTextEntry1] : s/p left mastectomy with LINDA reconstruction. Pt s/p readmission for wound washout and closure and placement of drains DOS 01/03/2017.

## 2021-09-14 NOTE — PHYSICAL EXAM
[NI] : Normal [de-identified] : Abd healing well no sign of infection no erythema.  One drain removed one remains

## 2021-09-14 NOTE — REVIEW OF SYSTEMS
[Negative] : Respiratory [FreeTextEntry7] : Drains in place and draining.  Drains draining less than 30cc/day

## 2021-09-15 ENCOUNTER — APPOINTMENT (OUTPATIENT)
Dept: PLASTIC SURGERY | Facility: CLINIC | Age: 66
End: 2021-09-15

## 2021-09-20 ENCOUNTER — APPOINTMENT (OUTPATIENT)
Dept: PLASTIC SURGERY | Facility: CLINIC | Age: 66
End: 2021-09-20
Payer: MEDICARE

## 2021-09-20 VITALS
DIASTOLIC BLOOD PRESSURE: 88 MMHG | HEIGHT: 61 IN | SYSTOLIC BLOOD PRESSURE: 150 MMHG | HEART RATE: 67 BPM | TEMPERATURE: 96.5 F | WEIGHT: 185 LBS | OXYGEN SATURATION: 99 % | BODY MASS INDEX: 34.93 KG/M2

## 2021-09-20 PROCEDURE — 99024 POSTOP FOLLOW-UP VISIT: CPT

## 2021-09-27 ENCOUNTER — APPOINTMENT (OUTPATIENT)
Dept: PLASTIC SURGERY | Facility: CLINIC | Age: 66
End: 2021-09-27
Payer: MEDICARE

## 2021-09-27 VITALS — TEMPERATURE: 97.7 F | HEIGHT: 61 IN | WEIGHT: 185 LBS | BODY MASS INDEX: 34.93 KG/M2

## 2021-09-27 PROCEDURE — 99024 POSTOP FOLLOW-UP VISIT: CPT

## 2021-09-30 NOTE — REASON FOR VISIT
[Post Op: _________] : a [unfilled] post op visit [FreeTextEntry1] : s/p panniculectomy DOS 9/3/21. pt is doing well, no complaints.

## 2021-09-30 NOTE — PHYSICAL EXAM
[NI] : Normal [de-identified] : Abd healing well no sign of infection no erythema no fluid collections

## 2021-09-30 NOTE — REVIEW OF SYSTEMS
[Negative] : Respiratory [FreeTextEntry7] : Drain in place and draining.  Drain draining less than 30cc/day

## 2021-10-05 ENCOUNTER — APPOINTMENT (OUTPATIENT)
Dept: NEUROLOGY | Facility: CLINIC | Age: 66
End: 2021-10-05

## 2021-10-06 NOTE — PHYSICAL EXAM
[NI] : Normal [de-identified] : Abd healing well no sign of infection no erythema.  Drain removed

## 2021-10-06 NOTE — REASON FOR VISIT
[Post Op: _________] : a [unfilled] post op visit [FreeTextEntry1] : Pt s/p panniculectomy DOS 9/3/21. Pt has 1 drain, here for possible removal. She is doing well no complaints.

## 2021-10-18 ENCOUNTER — APPOINTMENT (OUTPATIENT)
Dept: PLASTIC SURGERY | Facility: CLINIC | Age: 66
End: 2021-10-18
Payer: MEDICARE

## 2021-10-18 VITALS
HEIGHT: 61 IN | DIASTOLIC BLOOD PRESSURE: 76 MMHG | BODY MASS INDEX: 34.93 KG/M2 | SYSTOLIC BLOOD PRESSURE: 138 MMHG | OXYGEN SATURATION: 97 % | HEART RATE: 72 BPM | WEIGHT: 185 LBS | TEMPERATURE: 97.6 F

## 2021-10-18 PROCEDURE — 99024 POSTOP FOLLOW-UP VISIT: CPT

## 2021-10-18 NOTE — PHYSICAL EXAM
[NI] : Normal [de-identified] : Abd healing well no sign of infection no erythema no fluid collections.  Contour much improved

## 2021-10-18 NOTE — REASON FOR VISIT
[Post Op: _________] : a [unfilled] post op visit [FreeTextEntry1] : s/p panniculectomy DOS 9/3/21. pt is doing well, no complaints. \par

## 2021-10-18 NOTE — HISTORY OF PRESENT ILLNESS
[FreeTextEntry1] : Pt s/p panniculectomy doing well no complaints.  Pt very happy with the results.

## 2021-11-16 ENCOUNTER — APPOINTMENT (OUTPATIENT)
Dept: PLASTIC SURGERY | Facility: CLINIC | Age: 66
End: 2021-11-16
Payer: MEDICARE

## 2021-11-16 VITALS
BODY MASS INDEX: 35.87 KG/M2 | SYSTOLIC BLOOD PRESSURE: 154 MMHG | HEART RATE: 80 BPM | DIASTOLIC BLOOD PRESSURE: 88 MMHG | WEIGHT: 190 LBS | OXYGEN SATURATION: 98 % | TEMPERATURE: 97.3 F | HEIGHT: 61 IN

## 2021-11-16 PROCEDURE — 99024 POSTOP FOLLOW-UP VISIT: CPT

## 2021-11-17 NOTE — PHYSICAL EXAM
[NI] : Normal [de-identified] : Breasts heavy bilaterally  [de-identified] : Abd healing well no sign of infection no erythema no fluid collections.  Contour much improved

## 2021-11-17 NOTE — REASON FOR VISIT
[Post Op: _________] : a [unfilled] post op visit [FreeTextEntry1] : S/p panniculectomy DOS 9/3/21. PT is doing well, denies any complaints.

## 2021-11-17 NOTE — HISTORY OF PRESENT ILLNESS
[FreeTextEntry1] : Pt s/p panniculectomy doing well no complaints.  Pt very happy with the results.  Pt also here to discuss breast revisions

## 2021-11-23 ENCOUNTER — APPOINTMENT (OUTPATIENT)
Dept: NEUROLOGY | Facility: CLINIC | Age: 66
End: 2021-11-23
Payer: MEDICARE

## 2021-11-23 VITALS
BODY MASS INDEX: 36.82 KG/M2 | HEART RATE: 84 BPM | HEIGHT: 61 IN | WEIGHT: 195 LBS | DIASTOLIC BLOOD PRESSURE: 81 MMHG | SYSTOLIC BLOOD PRESSURE: 154 MMHG

## 2021-11-23 PROCEDURE — 99205 OFFICE O/P NEW HI 60 MIN: CPT

## 2021-11-23 NOTE — HISTORY OF PRESENT ILLNESS
[FreeTextEntry1] : This is a 66-year-old right-handed female who presents with chief complaints of tremors.\par \par Patient states that over the last year she has noticed tremors on the right side of her face and sometimes on the right hand.  This is rare, happened a couple times during the year.  The tremors do not interfere with activities.  Patient states that whenever she is anxious or stressed then she notices the tremor.  For example she states she has a grandson who is 17 years old and is special needs a whenever she is changing his diaper or is emotionally under stress she notices the tremor.  She drinks 1 cup of coffee a day and that does not increase tremors.  She does not drink any alcohol\par She denies any slowness or stiffness.  No change in handwriting.\par Denies any changes in memory, no headaches, no anosmia, no dysphagia.  No difficulty with control of urine.  She has on and off constipation due to irritable bowel syndrome.  Denies any changes in balance.\par \par Review of systems-a complete review of systems is performed and is negative except as listed in HPI\par \par Past medical history patient had mastectomy for breast cancer.  She is currently on anastrozole which she will complete by the end of this year.  She also reports having high blood pressure.  Parathyroidectomy\par \par Family history her mother had Parkinson's disease and Alzheimer's dementia\par

## 2021-11-23 NOTE — DISCUSSION/SUMMARY
[FreeTextEntry1] : This is a 66-year-old right-handed female who presents with chief complaints of rare intermittent tremors of the right hand and right side of her face especially when she is under stress.\par Neurological exam is nonfocal.  No tremors or abnormal movements are seen during exam\par No parkinsonian features\par \par Impression-tremors ?  Possibly enhanced physiologic tremor especially as patient states it happens during times of stress.\par During the office visit did not observe any facial tremor or twitching\par \par Plan\par Given history of breast cancer suggested getting MRI of the brain to rule out intracranial causes, however patient wishes to hold off.\par She wishes to follow up with neurology as needed\par Suggested checking B12, folate and thyroid levels.  Patient states that she will be having some blood work in the near future and will get it done at that time\par All questions were addressed and answered\par \par

## 2021-11-23 NOTE — PHYSICAL EXAM
[FreeTextEntry1] : On exam patient is awake and alert.  She is pleasant and cooperative with the exam.\par Speech is clear and understandable.  Facial expression is normal.  Extraocular movements are intact face is symmetric tongue and uvula midline and symmetric.  No abnormal facial movements are seen.\par \par No resting action or postural tremors are seen.  \par No bradykinesia or rigidity\par Strength 5 x 5\par No dysmetria or dysdiadochokinesia\par No difficulty getting up from the chair\par Deep tendon reflexes are 1+ symmetric\par Gait is unremarkable\par Spirals are drawn in E HR unremarkable\par Handwriting is normal in size

## 2021-11-30 NOTE — REVIEW OF SYSTEMS
[Negative] : Heme/Lymph [FreeTextEntry7] : +abd pannus  [de-identified] : Breasts healed well  asymmetric abdominal pannus

## 2021-11-30 NOTE — REASON FOR VISIT
[Consultation] : a consultation visit [FreeTextEntry1] : pt last seen in 02/10/2017 s/p  left mastectomy with LINDA reconstruction. Pt s/p readmission for wound washout and closure and placement of drains DOS 01/03/2017. pt reports due family issues and covid she has not been able to follow up to discuss revision and next step for breast reconstruction. pt c/o left abdominal donor site discomfort and bulging.

## 2021-11-30 NOTE — HISTORY OF PRESENT ILLNESS
[FreeTextEntry1] : Pt s/p left mastectomy with LINDA reconstruction.  Pt doing well no complaints.  Pt has had a lot of family issues to take care of and has held off on any breast or abd revisions to take care of her family.  Pt states she is ready do her revisions now.  Her biggest complaint is the abdominal pannus and asymmetry of her abdomen. She also feels that her breasts, though symmetrical, are too large.

## 2021-11-30 NOTE — PHYSICAL EXAM
[NI] : Normal [de-identified] : Breast healed well.  Left breast slightly larger than right breast [de-identified] : Abd healed well + abdominal pannus left side larger than right

## 2022-01-01 NOTE — H&P PST ADULT - NEUROLOGICAL DETAILS
responds to pain/responds to verbal commands/alert and oriented x 3/sensation intact/cranial nerves intact/normal strength
DISPLAY PLAN FREE TEXT

## 2022-02-07 ENCOUNTER — APPOINTMENT (OUTPATIENT)
Dept: CARDIOLOGY | Facility: CLINIC | Age: 67
End: 2022-02-07
Payer: MEDICARE

## 2022-02-07 ENCOUNTER — NON-APPOINTMENT (OUTPATIENT)
Age: 67
End: 2022-02-07

## 2022-02-07 VITALS
BODY MASS INDEX: 37.76 KG/M2 | HEIGHT: 61 IN | OXYGEN SATURATION: 98 % | RESPIRATION RATE: 12 BRPM | WEIGHT: 200 LBS | TEMPERATURE: 96.4 F | HEART RATE: 80 BPM | SYSTOLIC BLOOD PRESSURE: 163 MMHG | DIASTOLIC BLOOD PRESSURE: 90 MMHG

## 2022-02-07 VITALS — SYSTOLIC BLOOD PRESSURE: 130 MMHG | DIASTOLIC BLOOD PRESSURE: 86 MMHG

## 2022-02-07 DIAGNOSIS — R00.2 PALPITATIONS: ICD-10-CM

## 2022-02-07 PROCEDURE — 93000 ELECTROCARDIOGRAM COMPLETE: CPT

## 2022-02-07 PROCEDURE — 99204 OFFICE O/P NEW MOD 45 MIN: CPT

## 2022-02-07 RX ORDER — OXYCODONE 5 MG/1
5 TABLET ORAL
Qty: 10 | Refills: 0 | Status: DISCONTINUED | COMMUNITY
Start: 2021-09-03

## 2022-02-07 RX ORDER — ANASTROZOLE TABLETS 1 MG/1
1 TABLET ORAL DAILY
Qty: 90 | Refills: 1 | Status: DISCONTINUED | COMMUNITY
Start: 2017-05-23 | End: 2022-02-07

## 2022-02-07 NOTE — HISTORY OF PRESENT ILLNESS
[FreeTextEntry1] : Clara is a 67-year-old female breast cancer s/p left mastectomy with complications after reconstruction, DM, htn, hyperlipidemia who presents to establish care. No CP, palpitations or SOB. She was seeing Dr. Simpson. Cares for special needs grandson.

## 2022-02-07 NOTE — DISCUSSION/SUMMARY
[___ Month(s)] : in [unfilled] month(s) [FreeTextEntry1] : The patient is a 67-year-old female breast cancer s/p anastrazole, DM, htn, hld, SVT who is currently asymptomatic.\par #1 CV- normal ECG, obtain echo and stres from Dr. Simpson\par #2 Htn- continue losartan, amlodipine, consider HCTZ\par #3 Lipids- continue pravastatin, labs next week\par #4 DM- on metformin\par #5 Onc- 5 yearss/p left mastectomy and anastrazole 5 years, no recurrence, reconstruction and revision\par #6 General- on Vit D, We discussed adherence to a Mediterranean diet, weight loss and at least 30 minutes of daily exercise.\par

## 2022-02-07 NOTE — REVIEW OF SYSTEMS
[Dyspnea on exertion] : dyspnea during exertion [Negative] : Heme/Lymph [SOB] : no shortness of breath [Chest Discomfort] : no chest discomfort [Leg Claudication] : no intermittent leg claudication [Palpitations] : no palpitations [Orthopnea] : no orthopnea [Syncope] : no syncope

## 2022-03-01 ENCOUNTER — APPOINTMENT (OUTPATIENT)
Dept: OBGYN | Facility: CLINIC | Age: 67
End: 2022-03-01
Payer: MEDICARE

## 2022-03-01 VITALS
WEIGHT: 190 LBS | HEIGHT: 61 IN | HEART RATE: 70 BPM | SYSTOLIC BLOOD PRESSURE: 152 MMHG | BODY MASS INDEX: 35.87 KG/M2 | DIASTOLIC BLOOD PRESSURE: 82 MMHG

## 2022-03-01 PROCEDURE — 99387 INIT PM E/M NEW PAT 65+ YRS: CPT

## 2022-03-01 NOTE — PHYSICAL EXAM
[Chaperone Declined] : Patient declined chaperone [Appropriately responsive] : appropriately responsive [Alert] : alert [No Acute Distress] : no acute distress [No Lymphadenopathy] : no lymphadenopathy [Soft] : soft [Non-tender] : non-tender [Non-distended] : non-distended [No Lesions] : no lesions [No Mass] : no mass [Oriented x3] : oriented x3 [Examination Of The Breasts] : a normal appearance [No Masses] : no breast masses were palpable [Labia Minora] : normal [Labia Majora] : normal [Normal] : normal [Uterine Adnexae] : normal [FreeTextEntry4] : Color pink, no distress, [FreeTextEntry5] : Resp. rate wnl, color pink, no SOB [FreeTextEntry6] : L mastectomy

## 2022-03-01 NOTE — PLAN
[FreeTextEntry1] : HCM\par -SBE\par -pap & HPV  Vaginal (HERBER but remote Hx of abnormal paps and cryo)\par -Rx mammo/sono R\par Pt. with Left mastectomy\par -MVI, Calcium, Vit d\par -Weight/exercise\par RTO 1 year\par \par Breast Sx - Dr. Neumann/William\par Onc. Dr. Seo\par PCP - Dr. Raymundo Mohan\par Endocrine - referred to [facility]\par Cardiologist Dr. Nguyen\par

## 2022-03-01 NOTE — HISTORY OF PRESENT ILLNESS
[Patient reported mammogram was normal] : Patient reported mammogram was normal [Patient reported breast sonogram was normal] : Patient reported breast sonogram was normal [Patient reported colonoscopy was normal] : Patient reported colonoscopy was normal [TextBox_4] : 66yo presents for establishment of care with c/o hot flashes and burning with urination.  Pt. had Ucx last week and is following up tomorrow with MD\par \par   x 3 (1st child only lived 6 months r/t additional chromosome), grandson with angelman syndrome\par Allergies - Codeine\par \par Current meds: metropolol 100 mg daily, losartan 100gm daily, spironolactone 25mg daily, pravastatin 40mg daily, metformin 1000mg daily, amlodipine 5mg daily, omeprazole 40mg daily, vit D3 2000 iu daily, Ecotrin 81mg 2 x week\par \par Hx of Diabetes, Hypertension, Breast cancer, Fibroids, anastrazole x 5 years d/lindy 2022, Elevated Calcium levels\par Remote Hx of abnormal paps with Cryo \par \par Sx Hx Hysterectomy age 45, cholecystectomy, appendectomy, Mastectomy Left, Tonsils, Parathyroid Sx\par \par Fam Hx Diabetes - mother\par Hypertension, Father\par Heart disease - father and sibling\par Kidney failure Father\par \par Denies Restoration or philosophical objection to receiving blood\par Currently sexually active\par first intercourse age 17\par G3\par 1st period age 12\par  [Mammogramdate] : 4/2021 [BreastSonogramDate] : 4/2021 [PapSmeardate] : 2017 [BoneDensityDate] : 2021 [ColonoscopyDate] : 2019

## 2022-03-08 LAB
CYTOLOGY CVX/VAG DOC THIN PREP: ABNORMAL
HPV HIGH+LOW RISK DNA PNL CVX: NOT DETECTED

## 2022-03-21 ENCOUNTER — NON-APPOINTMENT (OUTPATIENT)
Age: 67
End: 2022-03-21

## 2022-03-21 ENCOUNTER — APPOINTMENT (OUTPATIENT)
Dept: PULMONOLOGY | Facility: CLINIC | Age: 67
End: 2022-03-21
Payer: MEDICARE

## 2022-03-21 VITALS
DIASTOLIC BLOOD PRESSURE: 80 MMHG | HEART RATE: 91 BPM | HEIGHT: 60 IN | RESPIRATION RATE: 16 BRPM | WEIGHT: 198 LBS | BODY MASS INDEX: 38.87 KG/M2 | TEMPERATURE: 97.2 F | SYSTOLIC BLOOD PRESSURE: 124 MMHG | OXYGEN SATURATION: 98 %

## 2022-03-21 PROCEDURE — 94618 PULMONARY STRESS TESTING: CPT

## 2022-03-21 PROCEDURE — 94010 BREATHING CAPACITY TEST: CPT

## 2022-03-21 PROCEDURE — 71046 X-RAY EXAM CHEST 2 VIEWS: CPT

## 2022-03-21 PROCEDURE — 99214 OFFICE O/P EST MOD 30 MIN: CPT | Mod: 25

## 2022-03-21 NOTE — REVIEW OF SYSTEMS
[Cough] : cough [Chest Tightness] : chest tightness [SOB on Exertion] : sob on exertion [Chest Discomfort] : chest discomfort [Obesity] : obesity [Negative] : Psychiatric [Sputum] : no sputum [Dyspnea] : no dyspnea [Wheezing] : no wheezing

## 2022-03-21 NOTE — PROCEDURE
[FreeTextEntry1] : SPI performed in office show \par FEV1: 109% \par FEV1/FVC Ratio: 123% \par YQC61-06%: 215% \par \par 6MWT Performed in office shows:\par RA SPO2 @ REST 97% \par RA SPO2 @ EXERTION 94% \par \par CXR in office; Read by Dr. Brock. \par Impression: Cardiomegaly noted. No infiltrates, effusion or opacities noted.

## 2022-03-21 NOTE — REASON FOR VISIT
[Chest Pain] : chest pain [Cough] : cough [Obesity] : obesity [Acute] : an acute visit [Shortness of Breath] : shortness of breath

## 2022-03-21 NOTE — ASSESSMENT
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 She presents for an acute care visit. Her chief concern is she feels palpitations and chest pressure/heaviness at the same time, which will then cause a "balloon" like sensation which will then cause her to cough x 4 months. \par \par 1. SOB on exertion/Chest discomfort:\par DDX:\par - Can try adding Albuterol 2 puffs Q6H PRN. \par - likely r/t GERD: Continue Omeprazole Q AM; Add Famotidine 40 mg PO QHS. Advised to f/u with GI MD.\par - ? cardiac issues; BNP Rx'ed, pt advised to f/u with Rosita Donohue MD. \par - less likely ? PE; D-Dimer RX'ed for further evaluation. If elevated will RX CTA. \par - If D-Dimer WNL; will RX Chest CT no contrast\par \par Patient to follow up for visit and PFTs on 4/19/22. \par Patient to call with further questions and concerns.\par Patient verbalizes understanding of care plan and is agreeable.\par

## 2022-03-21 NOTE — HISTORY OF PRESENT ILLNESS
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 She presents for an acute care visit. \par \par Her chief concern is she feels palpitations and chest pressure/heaviness at the same time, which will then cause a "balloon" like sensation which will then cause her to cough x 4 months. \par \par She states she is not currently working, but she does watch her 4 grandchildren, one of them has special needs.  She states on days where she is more stressed, she fatigues more easily and becomes symptomatic of the palpitations and chest pressure. \par \par She notes she has established care with Dr. Hoskins d/t HTN and is supposed to have a follow up visit with her for possible echocardiogram. \par \par She states she is currently take Omeprazole 40 mg Q AM for many years. She states her last endoscopy was 2 years ago.  She states she did have benign polyps removed. \par \par She is s/p Moderna Covid-19 Vaccination x 3, last dose 12/18/21.

## 2022-04-06 LAB
DEPRECATED D DIMER PPP IA-ACNC: <150 NG/ML DDU
NT-PROBNP SERPL-MCNC: 47 PG/ML

## 2022-04-07 ENCOUNTER — NON-APPOINTMENT (OUTPATIENT)
Age: 67
End: 2022-04-07

## 2022-04-19 ENCOUNTER — APPOINTMENT (OUTPATIENT)
Dept: PULMONOLOGY | Facility: CLINIC | Age: 67
End: 2022-04-19

## 2022-04-25 ENCOUNTER — NON-APPOINTMENT (OUTPATIENT)
Age: 67
End: 2022-04-25

## 2022-04-25 ENCOUNTER — APPOINTMENT (OUTPATIENT)
Dept: CARDIOLOGY | Facility: CLINIC | Age: 67
End: 2022-04-25
Payer: MEDICARE

## 2022-04-25 VITALS
HEART RATE: 101 BPM | SYSTOLIC BLOOD PRESSURE: 169 MMHG | OXYGEN SATURATION: 95 % | WEIGHT: 196 LBS | TEMPERATURE: 97.3 F | RESPIRATION RATE: 14 BRPM | DIASTOLIC BLOOD PRESSURE: 98 MMHG | BODY MASS INDEX: 38.48 KG/M2 | HEIGHT: 60 IN

## 2022-04-25 DIAGNOSIS — I05.9 RHEUMATIC MITRAL VALVE DISEASE, UNSPECIFIED: ICD-10-CM

## 2022-04-25 DIAGNOSIS — R00.2 PALPITATIONS: ICD-10-CM

## 2022-04-25 PROCEDURE — 99213 OFFICE O/P EST LOW 20 MIN: CPT

## 2022-04-25 PROCEDURE — 93000 ELECTROCARDIOGRAM COMPLETE: CPT | Mod: 59

## 2022-04-25 NOTE — HISTORY OF PRESENT ILLNESS
[FreeTextEntry1] : Clara started to have palpitations almost daily and feels tightness at that time. She had echo and stress test in September to clear her for reconstruction surgery. She needs further reconstruction surgery but needs to know about the palpitations. Two years ago had abnormal carotid doppler.

## 2022-04-25 NOTE — DISCUSSION/SUMMARY
[FreeTextEntry1] : The patient is a 67-year-old female breast cancer s/p anastrazole, DM, htn, hld, SVT with new onset palpitations and SOB. \par #1 CV- normal ECG, ECHO and event monitor ordered\par #2 Htn- continue losartan, amlodipine, consider HCTZ\par #3 Lipids- continue pravastatin\par #4 DM- continue metformin\par #5 Onc- 5 years s/p left mastectomy and anastrazole 5 years, no recurrence, reconstruction and revision\par #6 General- on Vit D, We discussed adherence to a Mediterranean diet, weight loss and at least 30 minutes of daily exercise.\par

## 2022-04-25 NOTE — REVIEW OF SYSTEMS
[Dyspnea on exertion] : dyspnea during exertion [Negative] : Heme/Lymph [SOB] : no shortness of breath [Chest Discomfort] : no chest discomfort [Palpitations] : no palpitations [Leg Claudication] : no intermittent leg claudication [Orthopnea] : no orthopnea [Syncope] : no syncope

## 2022-05-05 ENCOUNTER — APPOINTMENT (OUTPATIENT)
Dept: CARDIOLOGY | Facility: CLINIC | Age: 67
End: 2022-05-05
Payer: MEDICARE

## 2022-05-05 PROCEDURE — 93306 TTE W/DOPPLER COMPLETE: CPT

## 2022-05-12 ENCOUNTER — APPOINTMENT (OUTPATIENT)
Dept: ULTRASOUND IMAGING | Facility: IMAGING CENTER | Age: 67
End: 2022-05-12
Payer: MEDICARE

## 2022-05-12 ENCOUNTER — OUTPATIENT (OUTPATIENT)
Dept: OUTPATIENT SERVICES | Facility: HOSPITAL | Age: 67
LOS: 1 days | End: 2022-05-12
Payer: COMMERCIAL

## 2022-05-12 ENCOUNTER — RESULT REVIEW (OUTPATIENT)
Age: 67
End: 2022-05-12

## 2022-05-12 ENCOUNTER — APPOINTMENT (OUTPATIENT)
Dept: MAMMOGRAPHY | Facility: IMAGING CENTER | Age: 67
End: 2022-05-12
Payer: MEDICARE

## 2022-05-12 DIAGNOSIS — Z90.710 ACQUIRED ABSENCE OF BOTH CERVIX AND UTERUS: Chronic | ICD-10-CM

## 2022-05-12 DIAGNOSIS — Z98.89 OTHER SPECIFIED POSTPROCEDURAL STATES: Chronic | ICD-10-CM

## 2022-05-12 DIAGNOSIS — Z90.12 ACQUIRED ABSENCE OF LEFT BREAST AND NIPPLE: Chronic | ICD-10-CM

## 2022-05-12 DIAGNOSIS — Z00.8 ENCOUNTER FOR OTHER GENERAL EXAMINATION: ICD-10-CM

## 2022-05-12 DIAGNOSIS — Z98.890 OTHER SPECIFIED POSTPROCEDURAL STATES: Chronic | ICD-10-CM

## 2022-05-12 DIAGNOSIS — E89.2 POSTPROCEDURAL HYPOPARATHYROIDISM: Chronic | ICD-10-CM

## 2022-05-12 PROCEDURE — 77067 SCR MAMMO BI INCL CAD: CPT | Mod: 26,RT,52

## 2022-05-12 PROCEDURE — 76641 ULTRASOUND BREAST COMPLETE: CPT | Mod: 26,RT

## 2022-05-12 PROCEDURE — 77063 BREAST TOMOSYNTHESIS BI: CPT | Mod: 26,52

## 2022-05-12 PROCEDURE — 77067 SCR MAMMO BI INCL CAD: CPT

## 2022-05-12 PROCEDURE — 76641 ULTRASOUND BREAST COMPLETE: CPT

## 2022-05-12 PROCEDURE — 77063 BREAST TOMOSYNTHESIS BI: CPT

## 2022-05-24 ENCOUNTER — APPOINTMENT (OUTPATIENT)
Dept: SURGICAL ONCOLOGY | Facility: CLINIC | Age: 67
End: 2022-05-24
Payer: MEDICARE

## 2022-05-24 VITALS
OXYGEN SATURATION: 99 % | TEMPERATURE: 98 F | DIASTOLIC BLOOD PRESSURE: 86 MMHG | HEART RATE: 94 BPM | BODY MASS INDEX: 37.5 KG/M2 | RESPIRATION RATE: 17 BRPM | WEIGHT: 191 LBS | HEIGHT: 60 IN | SYSTOLIC BLOOD PRESSURE: 135 MMHG

## 2022-05-24 PROCEDURE — 99213 OFFICE O/P EST LOW 20 MIN: CPT

## 2022-05-24 NOTE — HISTORY OF PRESENT ILLNESS
[de-identified] : Clara is a 62 year old female presents for a breast cancer follow up visit. She is s/p left mastectomy, left axillary sentinel & non sentinel lymph node biopsy with LINDA reconstruction by Dr. Thomas in November 2016. FInal pathology revealed a 7 mm DCIS, cribriform, micropapillary type, intermediate grade, no necrosis, negative lymph nodes, ER/OK(+). Tumor staging pTis pN0. She is currently on Anastrozole under the supervision of Dr. Elkins, tolerating well. \par \par 02/13/2018:  Patient presents for 6 months follow up.  She continues on Anastrazole for which she is tolerating.  She has occasional hot flashes but denies joint aches.  She denies new nodularity in the reconstructed left breast and offers no symptoms on the right breast.  Patient last underwent right breast mammogram and breast ultrasound 09/15/2017 which revealed 4 subcentimeter circumscribed masses in the medial right breast without ultrasound correlate - BIRADS 3.  She is due for 6 months follow up right mammogram in 03/2018.\par \par 10/09/2018:  Patient's 6 months follow up right mammogram 03/2018 - BI-RADS 2.  Offers no new complaints.  Was treated recently for Helicobacter Pylori and UTI.\par \par 04/09/2019:  Presenting for 6 months follow up.  Patient feels well and offers no new complaints.  She denies right breast pain, mass or skin thickening.   She had annual right mammogram 03/30/3019 - BI-RADS 1.\par \par 12/03/2019: Patient presents for 6 months follow up.  She reports a recurrent draining cyst of the inner right breast.  She denies other breast related issues.  She is due for annual mammogram 03/2020.\par \par 12/17/2019: Patient presents for excision of right breast inner cutaneous cyst.  Please see procedure note below.\par \par 12/24/2019: Doing well after excision.  Denies pain.  Pathology pending at this time.\par \par 06/30/2020: Doing well.  Recovered from COVID-19.  Denies breast related symptoms.  Right mammogram 05/2020 - BI-RADS 2.\par \par 05/25/2021: Patient is overall doing well.  Recent right breast imaging 05/19/2021 shows benign findings - BI-RADS 2.  She denies breast related complaints but is unhappy with breast asymmetry from reconstruction and asymmetry of lower abdomen from abdominoplasty.\par \par 05/24/2022: Patient is doing well.  She stopped Anastrozole 01/2022.  Breast imaging 05/2022 shows no suspicious findings.

## 2022-08-17 ENCOUNTER — OUTPATIENT (OUTPATIENT)
Dept: OUTPATIENT SERVICES | Facility: HOSPITAL | Age: 67
LOS: 1 days | Discharge: HOME | End: 2022-08-17

## 2022-08-17 DIAGNOSIS — Z98.89 OTHER SPECIFIED POSTPROCEDURAL STATES: Chronic | ICD-10-CM

## 2022-08-17 DIAGNOSIS — E89.2 POSTPROCEDURAL HYPOPARATHYROIDISM: Chronic | ICD-10-CM

## 2022-08-17 DIAGNOSIS — D05.10 INTRADUCTAL CARCINOMA IN SITU OF UNSPECIFIED BREAST: ICD-10-CM

## 2022-08-17 DIAGNOSIS — Z90.710 ACQUIRED ABSENCE OF BOTH CERVIX AND UTERUS: Chronic | ICD-10-CM

## 2022-08-17 DIAGNOSIS — Z90.12 ACQUIRED ABSENCE OF LEFT BREAST AND NIPPLE: Chronic | ICD-10-CM

## 2022-08-17 DIAGNOSIS — Z98.890 OTHER SPECIFIED POSTPROCEDURAL STATES: Chronic | ICD-10-CM

## 2022-08-22 ENCOUNTER — APPOINTMENT (OUTPATIENT)
Dept: HEMATOLOGY ONCOLOGY | Facility: CLINIC | Age: 67
End: 2022-08-22

## 2022-08-22 VITALS
SYSTOLIC BLOOD PRESSURE: 156 MMHG | WEIGHT: 188.47 LBS | RESPIRATION RATE: 17 BRPM | DIASTOLIC BLOOD PRESSURE: 87 MMHG | HEART RATE: 91 BPM | TEMPERATURE: 97.3 F | OXYGEN SATURATION: 99 % | HEIGHT: 58.86 IN | BODY MASS INDEX: 38 KG/M2

## 2022-08-22 PROCEDURE — 99213 OFFICE O/P EST LOW 20 MIN: CPT

## 2022-08-22 NOTE — HISTORY OF PRESENT ILLNESS
[de-identified] : Ms. Soto is being referred by Dr. Cirilo Foy for evaluation  for any additional therapy after her left mastectomy for DCIS.\par She was in her usual state of health until she had a screening mammography on . Calcifications were noted in the left central posterior breast she was brought back for a digital mammogram which revealed calcifications in the central posterior left breast  which was new from prior examination.  Stereotactic biopsy revealed ductal carcinoma in situ, solid pattern with high nuclear grade. There were microcalcifications present in the DCIS as well as benign tissue estrogen receptor was seen in 95% of nuclei and progesterone receptor and 10%.\par Bilateral breast MRI was performed on September 3 there was enhancement in the area of the prior biopsy of note there was stable benign mass enhancement of the lateral breast located lateral and superior to the biopsy-proven malignancy an additional biopsy was recommended\par A left lateral stereotactic core biopsy showed a tiny focus of atypical ductal hyperplasia and left medial core biopsy revealed atypical ductal hyperplasia.\par On  she had a left mastectomy  there was ductal carcinoma in situ, 7 mm in maximum dimension   cribriform and micropapillary and nuclear grade 2; one sentinel lymph node was negative and one left internal mammary node was negative\par A few years ago she had lancing of a cyst of some sort  on the superficial aspect of her right breast\par She gets regular mammograms. She has no history of hormonal exposure she had hysterectomy and salpingo-oophorectomy at age 46.\par She had 3 pregnancies;  one child  at 6 months with chromosomal abnormalities the other 2 children are in good health there is no family history of breast or ovarian cancer6\par active medical issues or diabetes, hypertension and hypercholesterolemia\par She has a history of a intermittently elevated calcium and saw endocrinologist 2 years ago (calcium elevated in [place])\par Her last bone density was 2 years ago and she was told was normal [de-identified] : Ms. BRENDAN CRAVEN  is here for a follow up appt for left breast high grade DCIS (ER 90, WV 10) in 7/ 2016 and is on treatment with Anastrozole since 12/2016. Tx care from Dr DONNELLY in 8/2019. \par Completed endocrine therapy 12/22021\par No new complaints \par mammogram 5/2022   BIRDAS 1\par DEXA 4/2019, 5/2021: osteopenia:  She takes vit D. She is f/b endo for hypercal. She recently had parathyroid surgery.\par CT C/A/P was negative for acute issues or malignancy 7/2019 (done due to cardiac reasons). she is f/b cardio. CT 6/2020: stable lung nodules (POST COVID)\par Takes care of 92 yr old father, walks regularly\par

## 2022-08-22 NOTE — PHYSICAL EXAM
[Fully active, able to carry on all pre-disease performance without restriction] : Status 0 - Fully active, able to carry on all pre-disease performance without restriction [Obese] : obese [Normal] : affect appropriate [de-identified] : left mastectomy with reconstruction. No chest wall or axillary nodules. Right breast exam normal  [de-identified] : scar from appendectomy and LINDA flap- healed well [de-identified] : psoriasis patches on BL elbow

## 2022-09-14 ENCOUNTER — APPOINTMENT (OUTPATIENT)
Dept: DERMATOLOGY | Facility: CLINIC | Age: 67
End: 2022-09-14

## 2022-09-14 VITALS — HEIGHT: 60 IN | WEIGHT: 185 LBS | BODY MASS INDEX: 36.32 KG/M2

## 2022-09-14 DIAGNOSIS — L40.9 PSORIASIS, UNSPECIFIED: ICD-10-CM

## 2022-09-14 PROCEDURE — 99204 OFFICE O/P NEW MOD 45 MIN: CPT

## 2022-10-10 ENCOUNTER — NON-APPOINTMENT (OUTPATIENT)
Age: 67
End: 2022-10-10

## 2022-10-10 ENCOUNTER — APPOINTMENT (OUTPATIENT)
Dept: CARDIOLOGY | Facility: CLINIC | Age: 67
End: 2022-10-10

## 2022-10-10 VITALS
WEIGHT: 190 LBS | RESPIRATION RATE: 12 BRPM | SYSTOLIC BLOOD PRESSURE: 149 MMHG | BODY MASS INDEX: 37.3 KG/M2 | HEIGHT: 60 IN | OXYGEN SATURATION: 99 % | DIASTOLIC BLOOD PRESSURE: 85 MMHG | HEART RATE: 93 BPM

## 2022-10-10 DIAGNOSIS — E11.9 TYPE 2 DIABETES MELLITUS W/OUT COMPLICATIONS: ICD-10-CM

## 2022-10-10 DIAGNOSIS — E78.00 PURE HYPERCHOLESTEROLEMIA, UNSPECIFIED: ICD-10-CM

## 2022-10-10 PROCEDURE — 93000 ELECTROCARDIOGRAM COMPLETE: CPT

## 2022-10-10 PROCEDURE — 99214 OFFICE O/P EST MOD 30 MIN: CPT | Mod: 25

## 2022-10-10 NOTE — HISTORY OF PRESENT ILLNESS
[FreeTextEntry1] : Clara has been having substernal chest heaviness radiating to left arm in the evening after work. More on stressful days. It can last up to an hour. She tries to relax but does not help. Occasional palpitations. She used to have sl NTG and asking for rx.

## 2022-10-10 NOTE — DISCUSSION/SUMMARY
[FreeTextEntry1] : The patient is a 67-year-old female breast cancer s/p anastrazole, DM, HTN, HLD, SVT with chest pain of unclear significance.\par #1 CV- normal ECG, ECHO and exercise stress test first, hold on medication changes for now\par #2 Htn- continue losartan, amlodipine, consider HCTZ\par #3 Lipids- continue pravastatin\par #4 DM- continue metformin\par #5 Onc- 5 years s/p left mastectomy and completed anastrazole 5 years, no recurrence, reconstruction and revision\par #6 General- on Vit D, We discussed adherence to a Mediterranean diet, weight loss and at least 30 minutes of daily exercise.\par  [EKG obtained to assist in diagnosis and management of assessed problem(s)] : EKG obtained to assist in diagnosis and management of assessed problem(s)

## 2022-10-18 ENCOUNTER — APPOINTMENT (OUTPATIENT)
Dept: CARDIOLOGY | Facility: CLINIC | Age: 67
End: 2022-10-18

## 2022-10-18 PROCEDURE — 93880 EXTRACRANIAL BILAT STUDY: CPT

## 2022-11-09 ENCOUNTER — APPOINTMENT (OUTPATIENT)
Dept: CARDIOLOGY | Facility: CLINIC | Age: 67
End: 2022-11-09

## 2023-01-17 ENCOUNTER — RX RENEWAL (OUTPATIENT)
Age: 68
End: 2023-01-17

## 2023-01-25 ENCOUNTER — APPOINTMENT (OUTPATIENT)
Dept: DERMATOLOGY | Facility: CLINIC | Age: 68
End: 2023-01-25

## 2023-03-01 ENCOUNTER — NON-APPOINTMENT (OUTPATIENT)
Age: 68
End: 2023-03-01

## 2023-03-01 ENCOUNTER — APPOINTMENT (OUTPATIENT)
Dept: CARDIOLOGY | Facility: CLINIC | Age: 68
End: 2023-03-01
Payer: MEDICARE

## 2023-03-01 VITALS
BODY MASS INDEX: 35.93 KG/M2 | HEIGHT: 60 IN | SYSTOLIC BLOOD PRESSURE: 152 MMHG | OXYGEN SATURATION: 100 % | WEIGHT: 183 LBS | DIASTOLIC BLOOD PRESSURE: 92 MMHG | TEMPERATURE: 97.7 F | HEART RATE: 85 BPM | RESPIRATION RATE: 12 BRPM

## 2023-03-01 VITALS — SYSTOLIC BLOOD PRESSURE: 134 MMHG | DIASTOLIC BLOOD PRESSURE: 86 MMHG

## 2023-03-01 PROCEDURE — 93000 ELECTROCARDIOGRAM COMPLETE: CPT

## 2023-03-01 PROCEDURE — 99214 OFFICE O/P EST MOD 30 MIN: CPT | Mod: 25

## 2023-03-01 RX ORDER — AMLODIPINE BESYLATE 5 MG/1
5 TABLET ORAL DAILY
Qty: 30 | Refills: 1 | Status: ACTIVE | COMMUNITY

## 2023-03-01 RX ORDER — NYSTATIN AND TRIAMCINOLONE ACETONIDE 100000; 1 [USP'U]/G; MG/G
100000-0.1 OINTMENT TOPICAL
Qty: 30 | Refills: 0 | Status: DISCONTINUED | COMMUNITY
Start: 2022-02-17 | End: 2023-03-01

## 2023-03-05 NOTE — DISCUSSION/SUMMARY
[FreeTextEntry1] : IMPRESSION: Ms. CRAVEN is a 68 year old woman with a history of HTN, Diabetes mellitus, family history of CAD, breast cancer status post left mastectomy about 6 years ago, and tachycardia who presents today for evaluation of chest discomfort and palpitations. \par \par PLAN:\par 1. She was in sinus rhythm on her ECG that was performed today. She had a 3 day ZIO patch less than a year ago that revealed rare overall ectopy. We discussed a possible longer ZIO patch, however, she will hold off at this time. We discussed the importance o stress management given her episodes of tachycardia and palpitations as well as staying well hydrated. \par 2. Her blood pressure was OK when it was repeated at the time of the physical examination. She will modify her diet and continue on Losartan 100 mg daily, Amlodipine 5 mg daily, and Metoprolol 100 mg twice daily.\par 3. She will schedule a nuclear stress test given her chest discomfort and shortness of breath.\par 4. She will follow up with me in 6 months or sooner should she experience any new or worsening symptoms in the interim or if there is any ischemia on her nuclear stress test.  [EKG obtained to assist in diagnosis and management of assessed problem(s)] : EKG obtained to assist in diagnosis and management of assessed problem(s)

## 2023-03-05 NOTE — CARDIOLOGY SUMMARY
[de-identified] : 3/1/2023: Sinus Rhythm at 80 beats per minute with low voltage in the precordial leads, and T wave abnormality, consider anterior and inferior ischemia [de-identified] : 5/5/2022: Mild aortic stenosis with an RAGHAVENDRA= 1.7 sqcm. Concentric remodeling. Endocardium not well visualized; grossly normal left ventricular systolic function. Borderline pulmonary HTN. PASP= 39 mmHg. Mild tricuspid regurgitation.

## 2023-03-05 NOTE — HISTORY OF PRESENT ILLNESS
[FreeTextEntry1] : Patient is a 68 year old woman with a history of HTN, Diabetes mellitus, family history of CAD, breast cancer status post left mastectomy about 6 years ago, and tachycardia who presents today for evaluation of chest discomfort and palpitations. She states that she was hospitalized in the past for tachycardia. She mentions experiencing frequent palpitations, which are usually stress-related.  She has experienced dyspnea at rest and with exertion. She has experienced episodes of chest pressure at rest. She sometimes wakes up from her sleep with shortness of breath. She does admit to snoring at night.

## 2023-03-05 NOTE — PHYSICAL EXAM
[Well Developed] : well developed [Well Nourished] : well nourished [No Acute Distress] : no acute distress [Normal Conjunctiva] : normal conjunctiva [Normal Venous Pressure] : normal venous pressure [Normal Rate] : normal [Rhythm Regular] : regular [Normal S1] : normal S1 [Normal S2] : normal S2 [I] : a grade 1 [S3] : no S3 [No Pitting Edema] : no pitting edema present [Bruit] : no bruit heard [Clear Lung Fields] : clear lung fields [Good Air Entry] : good air entry [No Respiratory Distress] : no respiratory distress  [Soft] : abdomen soft [Non Tender] : non-tender [Normal Gait] : normal gait [Normal Bowel Sounds] : normal bowel sounds [Gait - Sufficient for Exercise Testing] : gait - sufficient for exercise testing [No Edema] : no edema [No Cyanosis] : no cyanosis [No Rash] : no rash [No Focal Deficits] : no focal deficits [Moves all extremities] : moves all extremities [Normal Speech] : normal speech [Alert and Oriented] : alert and oriented [Normal memory] : normal memory [de-identified] : Extraocular muscles intact. Anicteric sclerae. [de-identified] : She was wearing a face mask during the examination.  [de-identified] : No visible skin ulcers.

## 2023-04-27 ENCOUNTER — APPOINTMENT (OUTPATIENT)
Dept: CV DIAGNOSTICS | Facility: HOSPITAL | Age: 68
End: 2023-04-27

## 2023-05-03 ENCOUNTER — APPOINTMENT (OUTPATIENT)
Dept: CARDIOLOGY | Facility: CLINIC | Age: 68
End: 2023-05-03

## 2023-05-15 ENCOUNTER — RESULT REVIEW (OUTPATIENT)
Age: 68
End: 2023-05-15

## 2023-05-16 ENCOUNTER — RESULT REVIEW (OUTPATIENT)
Age: 68
End: 2023-05-16

## 2023-05-16 ENCOUNTER — APPOINTMENT (OUTPATIENT)
Dept: MAMMOGRAPHY | Facility: CLINIC | Age: 68
End: 2023-05-16
Payer: MEDICAID

## 2023-05-16 ENCOUNTER — APPOINTMENT (OUTPATIENT)
Dept: ULTRASOUND IMAGING | Facility: CLINIC | Age: 68
End: 2023-05-16
Payer: MEDICAID

## 2023-05-16 PROCEDURE — 77063 BREAST TOMOSYNTHESIS BI: CPT | Mod: 52,RT

## 2023-05-16 PROCEDURE — 76641 ULTRASOUND BREAST COMPLETE: CPT | Mod: RT

## 2023-05-16 PROCEDURE — 77067 SCR MAMMO BI INCL CAD: CPT | Mod: RT,RT

## 2023-05-25 ENCOUNTER — APPOINTMENT (OUTPATIENT)
Dept: SURGICAL ONCOLOGY | Facility: CLINIC | Age: 68
End: 2023-05-25

## 2023-06-08 NOTE — PRE-OP CHECKLIST - TEMPERATURE IN CELSIUS (DEGREES C)
36.8 Myalgia Monitoring: I explained this is common when taking isotretinoin. If this worsens they will contact us.

## 2023-06-24 NOTE — ED ADULT NURSE NOTE - PSH
H/O total hysterectomy  2002  History of appendectomy    History of cholecystectomy    History of tonsillectomy 0 (no pain/absence of nonverbal indicators of pain)

## 2023-08-29 NOTE — PRE-OP CHECKLIST - PATIENT SENT AT
<<--- Click to launch 03-Jan-2017 03-Jan-2017 16:22 [facility] Pregnancy And Lactation Text: There is insufficient data to evaluate drug-associated risk for major birth defects, miscarriage, or other adverse maternal or fetal outcomes.  There is a pregnancy registry that monitors pregnancy outcomes in pregnant persons exposed to the medication during pregnancy.  It is unknown if this medication is excreted in breast milk.  Do not breastfeed during treatment and for about 4 weeks after the last dose.

## 2023-09-05 ENCOUNTER — APPOINTMENT (OUTPATIENT)
Dept: CARDIOLOGY | Facility: CLINIC | Age: 68
End: 2023-09-05

## 2023-09-13 NOTE — REVIEW OF SYSTEMS
What Type Of Note Output Would You Prefer (Optional)?: Bullet Format
How Severe Is Your Skin Lesion?: moderate
Has Your Skin Lesion Been Treated?: not been treated
Is This A New Presentation, Or A Follow-Up?: Skin Lesion
[Negative] : Allergic/Immunologic

## 2024-02-23 NOTE — PRE-OP CHECKLIST - BP NONINVASIVE SYSTOLIC (MM HG)
Angel Remy     : 1945     MRN: [id number]     DATE: 2024    DIAGNOSIS: Follow-up 2 weeks status post bilateral open excision of distal pole patella with patellar tendon advancement and repair    SUBJECTIVE:  Patient returns today for 2 week follow up of recent bilateral knee surgery.  Patient reports doing well with no unusual complaints.  Reports compliance with using knee immobilizer braces.  Patient reports compliance with the anticoagulation.    OBJECTIVE:     Exam:  Both knees are examined today.  The incisions are healing appropriately.  No signs of infection.  No significant tenderness to palpation.  The calf is soft and nontender with a negative Homans sign.    DIAGNOSTIC STUDIES     Xrays: AP and lateral views of the both knees were ordered and reviewed for evaluation of recent knee surgery.  Postsurgical changes noted.  Fracture appears well aligned bilaterally.  Patellas are in good alignment bilaterally.      ASSESSMENT:  2 week status post bilateral open excision of distal pole patella with patellar tendon advancement and repair    PLAN:  Patient is to remain nonweightbearing at this time.  Stat fitting of bilateral T-scopes locked in extension. Fit today. Due to instability of fractures.  Patient's knees to remain in full extension at all times.  I have agreed to refill his tramadol today.  The risk were discussed.  I will call him upon Dr. Roberson's return to office with follow up instructions.       Analy Hernandez, APRN    2024  
Subjective:      Patient ID: Michael Knight Jr. is a 55 y.o. male.    Chief Complaint: Pain of the Right Shoulder and Pain of the Left Shoulder    HPI: The patient is seen in consultation at the request of Virgil GORE, for a new problem with tingling and numbness in the distribution of the median nerve bilateral hands.  The patient is more symptomatic on the left than the right.  He recently had EMG/NCV studies which show carpal tunnel syndrome, worse on the left dominant hand than on the right.  The patient was seen in 2015 for shoulder and elbow concerns.  These have pretty much resolved.  The patient has tingling and paresthesias in the distribution of the median nerve in both hands.  This awakens him at night and when using his hands repetitively.    Review of Systems   Constitution: Negative for chills, decreased appetite, weight gain and weight loss.   HENT: Negative for congestion, ear pain, hearing loss and sore throat.    Eyes: Negative for blurred vision, double vision, vision loss in left eye, vision loss in right eye and visual disturbance.   Cardiovascular: Negative for chest pain, irregular heartbeat, leg swelling and palpitations.   Respiratory: Negative for cough and shortness of breath.    Endocrine: Negative for cold intolerance and heat intolerance.   Hematologic/Lymphatic: Negative for adenopathy. Does not bruise/bleed easily.   Skin: Negative for color change, nail changes, rash and suspicious lesions.   Gastrointestinal: Negative for abdominal pain, constipation, diarrhea, heartburn, nausea and vomiting.   Genitourinary: Negative for bladder incontinence and dysuria.   Neurological: Positive for numbness and paresthesias. Negative for dizziness, sensory change and tremors.        Of bilateral hands, left worse than right   Psychiatric/Behavioral: Negative for altered mental status, depression, memory loss and substance abuse.   Allergic/Immunologic: Negative for hives and persistent 
infections.         Objective:            General    Nursing note and vitals reviewed.  Constitutional: He is oriented to person, place, and time. He appears well-developed and well-nourished.   HENT:   Head: Normocephalic.   Nose: Nose normal.   Eyes: EOM are normal. Pupils are equal, round, and reactive to light.   Neck: Normal range of motion. Neck supple.   Cardiovascular: Normal rate and regular rhythm.    Pulmonary/Chest: Effort normal.   Abdominal: Soft. He exhibits no distension. There is no tenderness.   Neurological: He is alert and oriented to person, place, and time.   Psychiatric: He has a normal mood and affect. His behavior is normal.     General Musculoskeletal Exam   Gait: normal   Pelvic Obliquity: none    Right Ankle/Foot Exam   Right ankle exam is normal.    Range of Motion   The patient has normal right ankle ROM.    Alignment   Forefoot Alignment: normal    Muscle Strength   The patient has normal right ankle strength.    Tests   Anterior drawer: negative  Varus tilt: negative  Heel Walk: able to perform  Tiptoe Walk: able to perform    Other   Sensation: normal  Peroneal Subluxation: negative    Left Ankle/Foot Exam   Left ankle exam is normal.    Range of Motion   The patient has normal left ankle ROM.     Alignment   Forefoot Alignment: normal    Muscle Strength   The patient has normal left ankle strength.    Tests   Anterior drawer: negative  Varus tilt: negative  Heel Walk: able to perform  Tiptoe Walk: able to perform    Other   Sensation: normal  Peroneal Subluxation: negative    Right Knee Exam   Right knee exam is normal.    Inspection   Erythema: absent  Swelling: absent  Effusion: effusion  Deformity: deformity  Bruising: absent    Range of Motion   The patient has normal right knee ROM.    Tests   Meniscus   Rajendra:  Medial - negative Lateral - negative  Ligament Examination Lachman: normal (-1 to 2mm) PCL-Posterior Drawer: normal (0 to 2mm)     MCL - Valgus: normal (0 to 
2mm)  LCL - Varus: normalPivot Shift: normal (Equal)  Posterolateral Corner: unstable (>15 degrees difference)  Patella   Patellar Apprehension: negative  Passive Patellar Tilt: neutral  Patellar Tracking: normal  Patellar Grind: negative    Other   Sensation: normal    Left Knee Exam   Left knee exam is normal.    Inspection   Erythema: absent  Swelling: absent  Effusion: absent  Deformity: deformity  Bruising: absent    Range of Motion   The patient has normal left knee ROM.    Tests   Meniscus   Rajendra:  Medial - negative Lateral - negative  Stability Lachman: normal (-1 to 2mm) PCL-Posterior Drawer: normal (0 to 2mm)  MCL - Valgus: normal (0 to 2mm)  LCL - Varus: normal (0 to 2mm)Pivot Shift: normal (Equal)  Posterolateral Corner: unstable (>15 degrees difference)  Patella   Patellar Apprehension: negative  Passive Patellar Tilt: neutral  Patellar Tracking: normal  Patellar Grind: negative    Other   Sensation: normal    Right Hip Exam   Right hip exam is normal.     Inspection   Swelling: absent  Bruising: absent    Muscle Strength   The patient has normal right hip strength.    Other   Sensation: normal  Left Hip Exam   Left hip exam is normal.    Inspection   Swelling: absent  Bruising: absent    Range of Motion   The patient has normal left hip ROM.    Muscle Strength   The patient has normal left hip strength.     Other   Sensation: normal      Back (L-Spine & T-Spine) / Neck (C-Spine) Exam   Back exam is normal.    Neck (C-Spine) Range of Motion   Flexion:     Normal  Extension: Normal  Right Lateral Bend: normal  Left Lateral Bend: normal  Right Rotation: normal  Left Rotation: normal    Spinal Sensation   Right Side Sensation  C-Spine Level: normal   L-Spine Level: normal  S-Spine Level: normal  T-Spine Level: normal  Left Side Sensation  C-Spine Level: normal  L-Spine Level: normal  S-Spine Level: normal  T-Spine Level: normal    Other He has no scoliosis .  Head Tilt:  Negative      Right Hand/Wrist 
Exam   Right hand exam is normal.    Inspection   Deformity: Wrist - deformity     Tenderness   The patient is tender to palpation of the hair area.    Range of Motion   The patient has normal right hand/wrist ROM.    Tests   Phalens Sign: positive  Tinels Sign (Medial Nerve): positive  Finkelstein: negative  Cubital Tunnel Compression Test: negative      Other     Neuorologic Exam    Median Distribution: normal  Ulnar Distribution: normal  Radial Distribution: normal      Left Hand/Wrist Exam   Left hand exam is normal.    Inspection   Deformity: Wrist - absent     Tenderness   The patient is tender to palpation of the hair area.     Range of Motion   The patient has normal left hand/wrist ROM.    Tests   Phalens Sign: positive  Tinels Sign (Medial Nerve): positive  Finkelstein: negative  Cubital Tunnel Compression Test: negative      Other     Sensory Exam  Median Distribution: normal  Ulnar Distribution: normal  Radial Distribution: normal      Right Elbow Exam   Right elbow exam is normal.    Inspection   Effusion: absent  Bruising: absent  Deformity: absent    Range of Motion   The patient has normal right elbow ROM.    Tests Tinel's Sign (cubital tunnel): negative    Other   Sensation: normal      Left Elbow Exam   Left elbow exam is normal.    Inspection   Effusion: absent  Bruising: absent  Deformity: absent    Range of Motion   The patient has normal left elbow ROM.    Tests Tinel's Sign (cubital tunnel): negative    Other   Sensation: normal    Right Shoulder Exam   Right shoulder exam is normal.    Tenderness   The patient is tender to palpation of the greater tuberosity.    Range of Motion   Active Abduction: normal   Passive Abduction: normal   Extension: normal   Forward Flexion: normal   Forward Elevation: normal  Adduction: normal  External Rotation 0 degrees: normal   Internal Rotation 0 degrees: normal     Muscle Strength   The patient has normal right shoulder strength.    Tests & Signs 
  Apprehension: negative  Cross Arm: negative  Impingement: negative    Other   Sensation: normal    Left Shoulder Exam   Left shoulder exam is normal.    Range of Motion   Active Abduction: normal   Passive Abduction: normal   Extension: normal   Forward Flexion: normal   Forward Elevation: normal  Adduction: normal  External Rotation 0 degrees: normal   Internal Rotation 0 degrees: normal     Muscle Strength   The patient has normal left shoulder strength.    Tests & Signs   Apprehension: negative  Cross Arm: negative  Impingement: negative    Other   Sensation: normal       Muscle Strength   Right Upper Extremity   Wrist Extension:    Wrist Flexion:    :    Elbow Pronation:     Elbow Supination:     Elbow Extension:   Elbow Flexion:   Intrinsics:   Left Upper Extremity  Wrist Extension:    Wrist Flexion:    :     Elbow Pronation:     Elbow Supination:     Elbow Extension:   Elbow Flexion:   Intrinsics:   Right Lower Extremity   Hip Abduction:    Quadriceps:     Hamstrin/5   Left Lower Extremity   Hip Abduction:    Quadriceps:     Hamstrin/     Reflexes     Left Side  Biceps:  2+  Triceps:  2+  Quadriceps:  2+  Achilles:  2+    Right Side   Biceps:  2+  Triceps:  2+  Quadriceps:  2+  Achilles:  2+    Vascular Exam     Right Pulses  Dorsalis Pedis:      2+  Posterior Tibial:      2+  Radial:                    2+      Left Pulses  Dorsalis Pedis:      2+  Posterior Tibial:      2+  Radial:                    2+      Capillary Refill  Right Hand: normal capillary refill  Left Hand: normal capillary refill                    Assessment:           Encounter Diagnoses   Name Primary?    Carpal tunnel syndrome, left Yes    Carpal tunnel syndrome, right     Essential hypertension     Pre-operative clearance           Plan:     The patient desires to proceed with outpatient carpal tunnel release beginning with is most 
symptomatic left side.  He has been scheduled for outpatient surgery on March 20, 2017.  The patient will have his right carpal tunnel release at a later date.  He understands the material risks of surgery, what is involved, and desires to proceed.  
133

## 2024-02-29 NOTE — ASSESSMENT
[FreeTextEntry1] : Ms. 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 She is s/p left mastectomy 11/2016. \par \par 1. Breast ca- Completed endocrine therapy 12/22021\par No new complaints \par mammogram 5/2022   BIRDAS 1 \par Annual breast imaging reviewed.\par  hyperparathyroid: s/p surgery. F/B ENDO AND SURGERY\par encourage healthy diet and exercise.\par Continue followup with primary care. I recommend age-appropriate malignancy screening\par Psoriasis- rec derm f/u \par RTC 12 M
Uterine Fibroids    Uterine fibroids, also called leiomyomas, are noncancerous (benign) tumors that can grow in the uterus. They can cause heavy menstrual bleeding and pain. Fibroids may also grow in the fallopian tubes, cervix, or tissues (ligaments) near the uterus.    You may have one or many fibroids. Fibroids vary in size, weight, and where they grow in the uterus. Some can become quite large. Most fibroids do not require medical treatment.    What are the causes?  The cause of this condition is not known.    What increases the risk?  You are more likely to develop this condition if you:  Are in your 30s or 40s and have not gone through menopause.  Have a family history of this condition.  Are of  descent.  Started your menstrual period at age 10 or younger.  Have never given birth.  Are overweight or obese.  What are the signs or symptoms?  Many women do not have any symptoms. Symptoms of this condition may include:  Heavy menstrual bleeding.  Bleeding between menstrual periods.  Pain and pressure in the pelvic area, between your hip bones.  Pain during sex.  Bladder problems, such as needing to urinate right away or more often than usual.  Inability to have children (infertility).  Failure to carry pregnancy to term (miscarriage).  How is this diagnosed?  This condition may be diagnosed based on:  Your symptoms and medical history.  A physical exam.  A pelvic exam that includes feeling for any tumors.  Imaging tests, such as ultrasound or MRI.  How is this treated?  Treatment for this condition may include follow-up visits with your health care provider to monitor your fibroids for any changes. Other treatment may include:  Medicines, such as:  Medicines to relieve pain, including aspirin and NSAIDs, such as ibuprofen or naproxen.  Hormone therapy. Treatment may be given as a pill or an injection, or it may be inserted into the uterus using an intrauterine device (IUD).  Surgery that would do one of the following:  Remove the fibroids (myomectomy). This may be recommended if fibroids affect your fertility and you want to become pregnant.  Remove the uterus (hysterectomy).  Block the blood supply to the fibroids (uterine artery embolization). This can cause them to shrink and die.  Follow these instructions at home:  Medicines    Take over-the-counter and prescription medicines only as told by your health care provider.  Ask your health care provider if you should take iron pills or eat more iron-rich foods, such as dark green, leafy vegetables. Heavy menstrual bleeding can cause low iron levels.  Managing pain      If directed, apply heat to your back or abdomen to reduce pain. Use the heat source that your health care provider recommends, such as a moist heat pack or a heating pad. To apply heat:  Place a towel between your skin and the heat source.  Leave the heat on for 20–30 minutes.  Remove the heat if your skin turns bright red. This is especially important if you are unable to feel pain, heat, or cold. You may have a greater risk of getting burned.  General instructions    Pay close attention to your menstrual cycle. Tell your health care provider about any changes, such as:  Heavier bleeding that requires you to change your pads or tampons more than usual.  A change in the number of days that your menstrual period lasts.  A change in symptoms that come with your menstrual period, such as back pain or cramps in your abdomen.  Keep all follow-up visits. This is important, especially if your fibroids need to be monitored for any changes.  Contact a health care provider if you:  Have pelvic pain, back pain, or cramps in your abdomen that do not get better with medicine or heat.  Develop new bleeding between menstrual periods.  Have increased bleeding during or between menstrual periods.  Feel more tired or weak than usual.  Feel light-headed.  Get help right away if you:  Faint.  Have pelvic pain that suddenly gets worse.  Have severe vaginal bleeding that soaks a tampon or pad in 30 minutes or less.  Summary  Uterine fibroids are noncancerous (benign) tumors that can develop in the uterus.  The exact cause of this condition is not known.  Most fibroids do not require medical treatment unless they affect your ability to have children (fertility).  Contact a health care provider if you have pelvic pain, back pain, or cramps in your abdomen that do not get better with medicines.  Get help right away if you faint, have pelvic pain that suddenly gets worse, or have severe vaginal bleeding.  This information is not intended to replace advice given to you by your health care provider. Make sure you discuss any questions you have with your health care provider.

## 2024-04-04 ENCOUNTER — APPOINTMENT (OUTPATIENT)
Dept: INTERNAL MEDICINE | Facility: CLINIC | Age: 69
End: 2024-04-04
Payer: MEDICARE

## 2024-04-04 ENCOUNTER — LABORATORY RESULT (OUTPATIENT)
Age: 69
End: 2024-04-04

## 2024-04-04 VITALS — DIASTOLIC BLOOD PRESSURE: 82 MMHG | SYSTOLIC BLOOD PRESSURE: 140 MMHG

## 2024-04-04 VITALS
OXYGEN SATURATION: 99 % | WEIGHT: 182.44 LBS | SYSTOLIC BLOOD PRESSURE: 155 MMHG | BODY MASS INDEX: 35.82 KG/M2 | HEART RATE: 73 BPM | TEMPERATURE: 98.2 F | HEIGHT: 60 IN | DIASTOLIC BLOOD PRESSURE: 91 MMHG

## 2024-04-04 DIAGNOSIS — T45.1X5A FLUSHING: ICD-10-CM

## 2024-04-04 DIAGNOSIS — Z87.39 PERSONAL HISTORY OF OTHER DISEASES OF THE MUSCULOSKELETAL SYSTEM AND CONNECTIVE TISSUE: ICD-10-CM

## 2024-04-04 DIAGNOSIS — R09.89 OTHER SPECIFIED SYMPTOMS AND SIGNS INVOLVING THE CIRCULATORY AND RESPIRATORY SYSTEMS: ICD-10-CM

## 2024-04-04 DIAGNOSIS — Z79.890 HORMONE REPLACEMENT THERAPY: ICD-10-CM

## 2024-04-04 DIAGNOSIS — R71.8 OTHER ABNORMALITY OF RED BLOOD CELLS: ICD-10-CM

## 2024-04-04 DIAGNOSIS — Z87.898 PERSONAL HISTORY OF OTHER SPECIFIED CONDITIONS: ICD-10-CM

## 2024-04-04 DIAGNOSIS — F43.9 REACTION TO SEVERE STRESS, UNSPECIFIED: ICD-10-CM

## 2024-04-04 DIAGNOSIS — Z00.00 ENCOUNTER FOR GENERAL ADULT MEDICAL EXAMINATION W/OUT ABNORMAL FINDINGS: ICD-10-CM

## 2024-04-04 DIAGNOSIS — Z86.39 PERSONAL HISTORY OF OTHER ENDOCRINE, NUTRITIONAL AND METABOLIC DISEASE: ICD-10-CM

## 2024-04-04 DIAGNOSIS — Z86.000 PERSONAL HISTORY OF IN-SITU NEOPLASM OF BREAST: ICD-10-CM

## 2024-04-04 DIAGNOSIS — Z86.69 PERSONAL HISTORY OF OTHER DISEASES OF THE NERVOUS SYSTEM AND SENSE ORGANS: ICD-10-CM

## 2024-04-04 DIAGNOSIS — R06.02 SHORTNESS OF BREATH: ICD-10-CM

## 2024-04-04 DIAGNOSIS — R23.2 FLUSHING: ICD-10-CM

## 2024-04-04 DIAGNOSIS — I10 ESSENTIAL (PRIMARY) HYPERTENSION: ICD-10-CM

## 2024-04-04 DIAGNOSIS — Z01.419 ENCOUNTER FOR GYNECOLOGICAL EXAMINATION (GENERAL) (ROUTINE) W/OUT ABNORMAL FINDINGS: ICD-10-CM

## 2024-04-04 DIAGNOSIS — R91.1 SOLITARY PULMONARY NODULE: ICD-10-CM

## 2024-04-04 DIAGNOSIS — Z86.2 PERSONAL HISTORY OF DISEASES OF THE BLOOD AND BLOOD-FORMING ORGANS AND CERTAIN DISORDERS INVOLVING THE IMMUNE MECHANISM: ICD-10-CM

## 2024-04-04 DIAGNOSIS — U07.1 COVID-19: ICD-10-CM

## 2024-04-04 DIAGNOSIS — G47.8 OTHER SLEEP DISORDERS: ICD-10-CM

## 2024-04-04 DIAGNOSIS — Z98.890 OTHER SPECIFIED POSTPROCEDURAL STATES: ICD-10-CM

## 2024-04-04 DIAGNOSIS — Z87.2 PERSONAL HISTORY OF DISEASES OF THE SKIN AND SUBCUTANEOUS TISSUE: ICD-10-CM

## 2024-04-04 DIAGNOSIS — E65 LOCALIZED ADIPOSITY: ICD-10-CM

## 2024-04-04 DIAGNOSIS — D64.9 ANEMIA, UNSPECIFIED: ICD-10-CM

## 2024-04-04 PROCEDURE — 99213 OFFICE O/P EST LOW 20 MIN: CPT | Mod: 25

## 2024-04-04 PROCEDURE — G0439: CPT

## 2024-04-04 RX ORDER — ASPIRIN 81 MG/1
81 TABLET, CHEWABLE ORAL
Refills: 0 | Status: DISCONTINUED | COMMUNITY
End: 2024-04-04

## 2024-04-04 RX ORDER — HYDROCHLOROTHIAZIDE 25 MG/1
25 TABLET ORAL DAILY
Qty: 90 | Refills: 3 | Status: ACTIVE | COMMUNITY
Start: 2024-04-04

## 2024-04-04 RX ORDER — CARVEDILOL 25 MG/1
25 TABLET, FILM COATED ORAL DAILY
Refills: 0 | Status: ACTIVE | COMMUNITY
Start: 2024-04-04

## 2024-04-04 RX ORDER — AMLODIPINE BESYLATE 10 MG/1
10 TABLET ORAL DAILY
Qty: 90 | Refills: 0 | Status: ACTIVE | COMMUNITY
Start: 2024-04-04

## 2024-04-04 RX ORDER — MOMETASONE FUROATE 1 MG/G
0.1 CREAM TOPICAL
Qty: 45 | Refills: 1 | Status: DISCONTINUED | COMMUNITY
Start: 2022-09-14 | End: 2024-04-04

## 2024-04-04 RX ORDER — ALBUTEROL SULFATE 90 UG/1
108 (90 BASE) INHALANT RESPIRATORY (INHALATION)
Qty: 1 | Refills: 1 | Status: DISCONTINUED | COMMUNITY
Start: 2022-03-21 | End: 2024-04-04

## 2024-04-04 RX ORDER — ASPIRIN 81 MG/1
81 TABLET, CHEWABLE ORAL
Qty: 90 | Refills: 0 | Status: ACTIVE | COMMUNITY
Start: 2024-04-04

## 2024-04-04 RX ORDER — FAMOTIDINE 40 MG/1
40 TABLET, FILM COATED ORAL
Qty: 90 | Refills: 1 | Status: DISCONTINUED | COMMUNITY
Start: 2022-03-21 | End: 2024-04-04

## 2024-04-05 NOTE — ADDENDUM
[FreeTextEntry1] : I, Prakash Garcia, acted as a scribe on behalf of Dr. Favian Kaur MD, on 04/05/2024.   All medical entries made by the scribe were at my, Dr. Favian Kaur MD, direction and personally dictated by me on 04/05/2024. I have reviewed the chart and agree that the record accurately reflects my personal performance of the history, physical exam, assessment and plan. I have also personally directed, reviewed, and agreed with the chart.

## 2024-04-05 NOTE — ASSESSMENT
[FreeTextEntry1] : Annual Wellness Visit -BP is stable. Continue current management. -Check A1c, lipid panel and Vitamin levels -Work up for anemia done today. -Advised to avoid NSAIDs. -DEXA ordered. -Continue to f/u with ortho and neuro for CTS. -Discussed shingles vaccine -RTO in 6 months.

## 2024-04-05 NOTE — HEALTH RISK ASSESSMENT
[Good] : ~his/her~  mood as  good [No] : In the past 12 months have you used drugs other than those required for medical reasons? No [No falls in past year] : Patient reported no falls in the past year [Feels Safe at Home] : Feels safe at home [Fully functional (bathing, dressing, toileting, transferring, walking, feeding)] : Fully functional (bathing, dressing, toileting, transferring, walking, feeding) [Fully functional (using the telephone, shopping, preparing meals, housekeeping, doing laundry, using] : Fully functional and needs no help or supervision to perform IADLs (using the telephone, shopping, preparing meals, housekeeping, doing laundry, using transportation, managing medications and managing finances) [Reports normal functional visual acuity (ie: able to read med bottle)] : Reports normal functional visual acuity [Smoke Detector] : smoke detector [Carbon Monoxide Detector] : carbon monoxide detector [Safety elements used in home] : safety elements used in home [Seat Belt] :  uses seat belt [Sunscreen] : uses sunscreen [Never] : Never [FreeTextEntry1] : Health Maintenance [Patient reported mammogram was normal] : Patient reported mammogram was normal [Patient reported colonoscopy was normal] : Patient reported colonoscopy was normal [Change in mental status noted] : No change in mental status noted [Language] : denies difficulty with language [Behavior] : denies difficulty with behavior [Learning/Retaining New Information] : denies difficulty learning/retaining new information [Handling Complex Tasks] : denies difficulty handling complex tasks [Reasoning] : denies difficulty with reasoning [Spatial Ability and Orientation] : denies difficulty with spatial ability and orientation [Reports changes in hearing] : Reports no changes in hearing [Reports changes in vision] : Reports no changes in vision [Reports changes in dental health] : Reports no changes in dental health [Guns at Home] : no guns at home [Travel to Developing Areas] : does not  travel to developing areas [TB Exposure] : is not being exposed to tuberculosis [MammogramDate] : 3/23 [BoneDensityComments] : Ordered [ColonoscopyDate] : 2021

## 2024-04-05 NOTE — PHYSICAL EXAM
[No Acute Distress] : no acute distress [Well Nourished] : well nourished [Well Developed] : well developed [Well-Appearing] : well-appearing [Normal Sclera/Conjunctiva] : normal sclera/conjunctiva [PERRL] : pupils equal round and reactive to light [EOMI] : extraocular movements intact [Normal Outer Ear/Nose] : the outer ears and nose were normal in appearance [Normal Oropharynx] : the oropharynx was normal [No JVD] : no jugular venous distention [No Lymphadenopathy] : no lymphadenopathy [Supple] : supple [Thyroid Normal, No Nodules] : the thyroid was normal and there were no nodules present [No Respiratory Distress] : no respiratory distress  [No Accessory Muscle Use] : no accessory muscle use [Clear to Auscultation] : lungs were clear to auscultation bilaterally [Normal Rate] : normal rate  [Regular Rhythm] : with a regular rhythm [Normal S1, S2] : normal S1 and S2 [No Murmur] : no murmur heard [No Carotid Bruits] : no carotid bruits [No Abdominal Bruit] : a ~M bruit was not heard ~T in the abdomen [No Varicosities] : no varicosities [Pedal Pulses Present] : the pedal pulses are present [No Edema] : there was no peripheral edema [No Palpable Aorta] : no palpable aorta [No Extremity Clubbing/Cyanosis] : no extremity clubbing/cyanosis [Normal Appearance] : normal in appearance [No Nipple Discharge] : no nipple discharge [No Axillary Lymphadenopathy] : no axillary lymphadenopathy [Soft] : abdomen soft [Non Tender] : non-tender [Non-distended] : non-distended [No Masses] : no abdominal mass palpated [No HSM] : no HSM [Normal Bowel Sounds] : normal bowel sounds [Normal Posterior Cervical Nodes] : no posterior cervical lymphadenopathy [Normal Anterior Cervical Nodes] : no anterior cervical lymphadenopathy [No CVA Tenderness] : no CVA  tenderness [No Spinal Tenderness] : no spinal tenderness [No Joint Swelling] : no joint swelling [Grossly Normal Strength/Tone] : grossly normal strength/tone [No Rash] : no rash [Coordination Grossly Intact] : coordination grossly intact [No Focal Deficits] : no focal deficits [Normal Gait] : normal gait [Deep Tendon Reflexes (DTR)] : deep tendon reflexes were 2+ and symmetric [Normal Affect] : the affect was normal [Normal Insight/Judgement] : insight and judgment were intact [de-identified] : Wrist splint on R hand

## 2024-04-05 NOTE — HISTORY OF PRESENT ILLNESS
[FreeTextEntry1] : Patient presents to [facility] and Medicare annual wellness visit.  [de-identified] : A 68 y/o F pt presents to office with Hx of Breast Ca, Hyperthyroidism, DM,  Saw provider in January and was told she had anemia. Denies any CP, chest tightness or SOB. Pt had Colonoscopy approximately 3 years ago. Denies any black tarry stools, abd pain or NSAID use. Denies any lightheadedness or dizziness. She is currently undergoing evaluation with ortho and neuro for CTS.

## 2024-04-08 RX ORDER — MOMETASONE FUROATE 1 MG/G
0.1 CREAM TOPICAL
Qty: 45 | Refills: 1 | Status: ACTIVE | COMMUNITY
Start: 2024-04-08 | End: 1900-01-01

## 2024-04-08 RX ORDER — OMEPRAZOLE 40 MG/1
40 CAPSULE, DELAYED RELEASE ORAL
Qty: 90 | Refills: 2 | Status: ACTIVE | COMMUNITY
Start: 2017-09-25 | End: 1900-01-01

## 2024-04-11 LAB
25(OH)D3 SERPL-MCNC: 46.9 NG/ML
ALBUMIN MFR SERPL ELPH: 57.4 %
ALBUMIN SERPL ELPH-MCNC: 4.7 G/DL
ALBUMIN SERPL-MCNC: 4.4 G/DL
ALBUMIN/GLOB SERPL: 1.4 RATIO
ALBUPE: 20.2 %
ALP BLD-CCNC: 80 U/L
ALPHA1 GLOB MFR SERPL ELPH: 4.4 %
ALPHA1 GLOB SERPL ELPH-MCNC: 0.3 G/DL
ALPHA1UPE: 36.2 %
ALPHA2 GLOB MFR SERPL ELPH: 12.7 %
ALPHA2 GLOB SERPL ELPH-MCNC: 1 G/DL
ALPHA2UPE: 20.1 %
ALT SERPL-CCNC: 13 U/L
ANION GAP SERPL CALC-SCNC: 17 MMOL/L
APPEARANCE: CLEAR
AST SERPL-CCNC: 10 U/L
B-GLOBULIN MFR SERPL ELPH: 13.4 %
B-GLOBULIN SERPL ELPH-MCNC: 1 G/DL
BASOPHILS # BLD AUTO: 0.06 K/UL
BASOPHILS NFR BLD AUTO: 0.8 %
BETAUPE: 16.1 %
BILIRUB SERPL-MCNC: 0.2 MG/DL
BILIRUBIN URINE: NEGATIVE
BLOOD URINE: NEGATIVE
BUN SERPL-MCNC: 16 MG/DL
CALCIUM SERPL-MCNC: 10 MG/DL
CHLORIDE SERPL-SCNC: 101 MMOL/L
CHOLEST SERPL-MCNC: 180 MG/DL
CO2 SERPL-SCNC: 22 MMOL/L
COLOR: YELLOW
CREAT SERPL-MCNC: 0.72 MG/DL
DEPRECATED KAPPA LC FREE/LAMBDA SER: 1.14 RATIO
EGFR: 90 ML/MIN/1.73M2
EOSINOPHIL # BLD AUTO: 0.18 K/UL
EOSINOPHIL NFR BLD AUTO: 2.3 %
ESTIMATED AVERAGE GLUCOSE: 143 MG/DL
FERRITIN SERPL-MCNC: 14 NG/ML
GAMMA GLOB FLD ELPH-MCNC: 0.9 G/DL
GAMMA GLOB MFR SERPL ELPH: 12.1 %
GAMMAUPE: 7.4 %
GLUCOSE QUALITATIVE U: NEGATIVE MG/DL
GLUCOSE SERPL-MCNC: 108 MG/DL
HAPTOGLOB SERPL-MCNC: 244 MG/DL
HBA1C MFR BLD HPLC: 6.6 %
HCT VFR BLD CALC: 38 %
HDLC SERPL-MCNC: 56 MG/DL
HGB BLD-MCNC: 11.9 G/DL
IGA 24H UR QL IFE: NORMAL
IGA SER QL IEP: 205 MG/DL
IGG SER QL IEP: 950 MG/DL
IGM SER QL IEP: 45 MG/DL
IMM GRANULOCYTES NFR BLD AUTO: 0.1 %
INTERPRETATION SERPL IEP-IMP: NORMAL
IRON SATN MFR SERPL: 7 %
IRON SERPL-MCNC: 28 UG/DL
KAPPA LC 24H UR QL: NORMAL
KAPPA LC CSF-MCNC: 1.64 MG/DL
KAPPA LC SERPL-MCNC: 1.87 MG/DL
KETONES URINE: NEGATIVE MG/DL
LDH SERPL-CCNC: 125 U/L
LDLC SERPL CALC-MCNC: 86 MG/DL
LEUKOCYTE ESTERASE URINE: ABNORMAL
LYMPHOCYTES # BLD AUTO: 2.23 K/UL
LYMPHOCYTES NFR BLD AUTO: 29.1 %
M PROTEIN SPEC IFE-MCNC: NORMAL
MAN DIFF?: NORMAL
MCHC RBC-ENTMCNC: 25.3 PG
MCHC RBC-ENTMCNC: 31.3 GM/DL
MCV RBC AUTO: 80.9 FL
MONOCYTES # BLD AUTO: 0.52 K/UL
MONOCYTES NFR BLD AUTO: 6.8 %
NEUTROPHILS # BLD AUTO: 4.67 K/UL
NEUTROPHILS NFR BLD AUTO: 60.9 %
NITRITE URINE: NEGATIVE
NONHDLC SERPL-MCNC: 124 MG/DL
PH URINE: 5.5
PLATELET # BLD AUTO: 266 K/UL
POTASSIUM SERPL-SCNC: 4.4 MMOL/L
PROT PATTERN 24H UR ELPH-IMP: NORMAL
PROT SERPL-MCNC: 7.6 G/DL
PROT UR-MCNC: 10 MG/DL
PROT UR-MCNC: 10 MG/DL
PROTEIN URINE: NEGATIVE MG/DL
RBC # BLD: 4.7 M/UL
RBC # FLD: 17.2 %
SODIUM SERPL-SCNC: 140 MMOL/L
SPECIFIC GRAVITY URINE: 1.02
TIBC SERPL-MCNC: 391 UG/DL
TRANSFERRIN SERPL-MCNC: 311 MG/DL
TRIGL SERPL-MCNC: 228 MG/DL
TSH SERPL-ACNC: 1.47 UIU/ML
UIBC SERPL-MCNC: 362 UG/DL
UROBILINOGEN URINE: 0.2 MG/DL
VIT B12 SERPL-MCNC: 261 PG/ML
WBC # FLD AUTO: 7.67 K/UL

## 2024-04-26 ENCOUNTER — APPOINTMENT (OUTPATIENT)
Dept: INTERNAL MEDICINE | Facility: CLINIC | Age: 69
End: 2024-04-26
Payer: MEDICARE

## 2024-04-26 VITALS
HEART RATE: 85 BPM | BODY MASS INDEX: 35.88 KG/M2 | DIASTOLIC BLOOD PRESSURE: 77 MMHG | HEIGHT: 59 IN | TEMPERATURE: 98.2 F | SYSTOLIC BLOOD PRESSURE: 131 MMHG | OXYGEN SATURATION: 99 % | WEIGHT: 178 LBS

## 2024-04-26 DIAGNOSIS — Z01.818 ENCOUNTER FOR OTHER PREPROCEDURAL EXAMINATION: ICD-10-CM

## 2024-04-26 PROCEDURE — 99213 OFFICE O/P EST LOW 20 MIN: CPT

## 2024-04-26 NOTE — HISTORY OF PRESENT ILLNESS
[No Pertinent Cardiac History] : no history of aortic stenosis, atrial fibrillation, coronary artery disease, recent myocardial infarction, or implantable device/pacemaker [No Pertinent Pulmonary History] : no history of asthma, COPD, sleep apnea, or smoking [No Adverse Anesthesia Reaction] : no adverse anesthesia reaction in self or family member [Diabetes] : diabetes [(Patient denies any chest pain, claudication, dyspnea on exertion, orthopnea, palpitations or syncope)] : Patient denies any chest pain, claudication, dyspnea on exertion, orthopnea, palpitations or syncope [Chronic Anticoagulation] : no chronic anticoagulation [Chronic Kidney Disease] : no chronic kidney disease [Moderate (4-6 METs)] : Moderate (4-6 METs) [FreeTextEntry1] : carpal tunnel release  [FreeTextEntry2] : 05/15 [FreeTextEntry3] : Dr. Parham

## 2024-04-26 NOTE — ADDENDUM
[FreeTextEntry1] : I, Maritza Romo, acted as a scribe on behalf of Dr. Favian Kaur MD, on 04/26/2024.   All medical entries made by the scribe were at my, Dr. Favian Kaur MD, direction and personally dictated by me on 04/26/2024. I have reviewed the chart and agree that the record accurately reflects my personal performance of the history, physical exam, assessment and plan. I have also personally directed, reviewed, and agreed with the chart.

## 2024-04-26 NOTE — ASSESSMENT
[Patient Optimized for Surgery] : Patient optimized for surgery [No Further Testing Recommended] : no further testing recommended [FreeTextEntry4] : Pt advised to stop aspirin one week before procedure.

## 2024-04-28 LAB
ALBUMIN SERPL ELPH-MCNC: 4.8 G/DL
ALP BLD-CCNC: 78 U/L
ALT SERPL-CCNC: 10 U/L
ANION GAP SERPL CALC-SCNC: 17 MMOL/L
APTT BLD: 34.1 SEC
AST SERPL-CCNC: 14 U/L
BASOPHILS # BLD AUTO: 0.04 K/UL
BASOPHILS NFR BLD AUTO: 0.6 %
BILIRUB SERPL-MCNC: 0.2 MG/DL
BUN SERPL-MCNC: 14 MG/DL
CALCIUM SERPL-MCNC: 9.7 MG/DL
CHLORIDE SERPL-SCNC: 100 MMOL/L
CO2 SERPL-SCNC: 24 MMOL/L
CREAT SERPL-MCNC: 0.69 MG/DL
EGFR: 94 ML/MIN/1.73M2
EOSINOPHIL # BLD AUTO: 0.12 K/UL
EOSINOPHIL NFR BLD AUTO: 1.7 %
GLUCOSE SERPL-MCNC: 118 MG/DL
HCT VFR BLD CALC: 37 %
HGB BLD-MCNC: 11.4 G/DL
IMM GRANULOCYTES NFR BLD AUTO: 0.3 %
INR PPP: 0.91 RATIO
LYMPHOCYTES # BLD AUTO: 1.98 K/UL
LYMPHOCYTES NFR BLD AUTO: 27.8 %
MAN DIFF?: NORMAL
MCHC RBC-ENTMCNC: 25 PG
MCHC RBC-ENTMCNC: 30.8 GM/DL
MCV RBC AUTO: 81.1 FL
MONOCYTES # BLD AUTO: 0.44 K/UL
MONOCYTES NFR BLD AUTO: 6.2 %
NEUTROPHILS # BLD AUTO: 4.52 K/UL
NEUTROPHILS NFR BLD AUTO: 63.4 %
PLATELET # BLD AUTO: 263 K/UL
POTASSIUM SERPL-SCNC: 4.1 MMOL/L
PROT SERPL-MCNC: 7.1 G/DL
PT BLD: 10.3 SEC
RBC # BLD: 4.56 M/UL
RBC # FLD: 17.2 %
SODIUM SERPL-SCNC: 141 MMOL/L
WBC # FLD AUTO: 7.12 K/UL

## 2024-04-30 ENCOUNTER — RX RENEWAL (OUTPATIENT)
Age: 69
End: 2024-04-30

## 2024-05-02 ENCOUNTER — APPOINTMENT (OUTPATIENT)
Dept: MAMMOGRAPHY | Facility: IMAGING CENTER | Age: 69
End: 2024-05-02
Payer: MEDICARE

## 2024-05-02 ENCOUNTER — APPOINTMENT (OUTPATIENT)
Dept: ULTRASOUND IMAGING | Facility: IMAGING CENTER | Age: 69
End: 2024-05-02
Payer: MEDICARE

## 2024-05-02 ENCOUNTER — RESULT REVIEW (OUTPATIENT)
Age: 69
End: 2024-05-02

## 2024-05-02 ENCOUNTER — OUTPATIENT (OUTPATIENT)
Dept: OUTPATIENT SERVICES | Facility: HOSPITAL | Age: 69
LOS: 1 days | End: 2024-05-02
Payer: MEDICARE

## 2024-05-02 DIAGNOSIS — Z98.89 OTHER SPECIFIED POSTPROCEDURAL STATES: Chronic | ICD-10-CM

## 2024-05-02 DIAGNOSIS — Z98.890 OTHER SPECIFIED POSTPROCEDURAL STATES: Chronic | ICD-10-CM

## 2024-05-02 DIAGNOSIS — Z00.8 ENCOUNTER FOR OTHER GENERAL EXAMINATION: ICD-10-CM

## 2024-05-02 DIAGNOSIS — Z90.12 ACQUIRED ABSENCE OF LEFT BREAST AND NIPPLE: Chronic | ICD-10-CM

## 2024-05-02 DIAGNOSIS — Z90.710 ACQUIRED ABSENCE OF BOTH CERVIX AND UTERUS: Chronic | ICD-10-CM

## 2024-05-02 DIAGNOSIS — E89.2 POSTPROCEDURAL HYPOPARATHYROIDISM: Chronic | ICD-10-CM

## 2024-05-02 PROCEDURE — 77063 BREAST TOMOSYNTHESIS BI: CPT | Mod: 26,52

## 2024-05-02 PROCEDURE — 77067 SCR MAMMO BI INCL CAD: CPT | Mod: 26,RT,52

## 2024-05-02 PROCEDURE — 77067 SCR MAMMO BI INCL CAD: CPT

## 2024-05-02 PROCEDURE — 77067 SCR MAMMO BI INCL CAD: CPT | Mod: 26,52

## 2024-05-02 PROCEDURE — 76641 ULTRASOUND BREAST COMPLETE: CPT | Mod: 26,RT

## 2024-05-02 PROCEDURE — 77063 BREAST TOMOSYNTHESIS BI: CPT

## 2024-05-02 PROCEDURE — 76641 ULTRASOUND BREAST COMPLETE: CPT

## 2024-05-28 ENCOUNTER — APPOINTMENT (OUTPATIENT)
Dept: SURGICAL ONCOLOGY | Facility: CLINIC | Age: 69
End: 2024-05-28
Payer: MEDICARE

## 2024-05-28 VITALS
HEIGHT: 59 IN | OXYGEN SATURATION: 96 % | HEART RATE: 78 BPM | BODY MASS INDEX: 35.28 KG/M2 | WEIGHT: 175 LBS | RESPIRATION RATE: 16 BRPM

## 2024-05-28 DIAGNOSIS — D05.10 INTRADUCTAL CARCINOMA IN SITU OF UNSPECIFIED BREAST: ICD-10-CM

## 2024-05-28 PROCEDURE — 99213 OFFICE O/P EST LOW 20 MIN: CPT

## 2024-05-31 RX ORDER — TRAMADOL HYDROCHLORIDE 50 MG/1
50 TABLET, COATED ORAL
Qty: 12 | Refills: 0 | Status: ACTIVE | COMMUNITY
Start: 2024-05-15

## 2024-05-31 RX ORDER — LORAZEPAM 0.5 MG/1
0.5 TABLET ORAL
Qty: 30 | Refills: 0 | Status: ACTIVE | COMMUNITY
Start: 2023-12-06

## 2024-05-31 RX ORDER — CARVEDILOL 12.5 MG/1
12.5 TABLET, FILM COATED ORAL
Qty: 180 | Refills: 0 | Status: ACTIVE | COMMUNITY
Start: 2023-07-18

## 2024-05-31 RX ORDER — SULFAMETHOXAZOLE AND TRIMETHOPRIM 400; 80 MG/1; MG/1
400-80 TABLET ORAL
Qty: 14 | Refills: 0 | Status: ACTIVE | COMMUNITY
Start: 2023-12-28

## 2024-05-31 NOTE — HISTORY OF PRESENT ILLNESS
[de-identified] : Clara is a 62 year old female presents for a breast cancer follow up visit. She is s/p left mastectomy, left axillary sentinel & non sentinel lymph node biopsy with LINDA reconstruction by Dr. Thomas in November 2016. FInal pathology revealed a 7 mm DCIS, cribriform, micropapillary type, intermediate grade, no necrosis, negative lymph nodes, ER/NH(+). Tumor staging pTis pN0. She is currently on Anastrozole under the supervision of Dr. Elkins, tolerating well.   02/13/2018:  Patient presents for 6 months follow up.  She continues on Anastrazole for which she is tolerating.  She has occasional hot flashes but denies joint aches.  She denies new nodularity in the reconstructed left breast and offers no symptoms on the right breast.  Patient last underwent right breast mammogram and breast ultrasound 09/15/2017 which revealed 4 subcentimeter circumscribed masses in the medial right breast without ultrasound correlate - BIRADS 3.  She is due for 6 months follow up right mammogram in 03/2018.  10/09/2018:  Patient's 6 months follow up right mammogram 03/2018 - BI-RADS 2.  Offers no new complaints.  Was treated recently for Helicobacter Pylori and UTI.  04/09/2019:  Presenting for 6 months follow up.  Patient feels well and offers no new complaints.  She denies right breast pain, mass or skin thickening.   She had annual right mammogram 03/30/3019 - BI-RADS 1.  12/03/2019: Patient presents for 6 months follow up.  She reports a recurrent draining cyst of the inner right breast.  She denies other breast related issues.  She is due for annual mammogram 03/2020.  12/17/2019: Patient presents for excision of right breast inner cutaneous cyst.  Please see procedure note below.  12/24/2019: Doing well after excision.  Denies pain.  Pathology pending at this time.  06/30/2020: Doing well.  Recovered from COVID-19.  Denies breast related symptoms.  Right mammogram 05/2020 - BI-RADS 2.  05/25/2021: Patient is overall doing well.  Recent right breast imaging 05/19/2021 shows benign findings - BI-RADS 2.  She denies breast related complaints but is unhappy with breast asymmetry from reconstruction and asymmetry of lower abdomen from abdominoplasty.  05/24/2022: Patient is doing well.  She stopped Anastrozole 01/2022.  Breast imaging 05/2022 shows no suspicious findings.  05/28/2024: Patient is doing well and denies breast symptoms.  Recent right carpel tunnel surgery. Right mammogram/breast ultrasound 05/02/2024 shows benign findings - BI-RADS 1.

## 2024-06-05 ENCOUNTER — APPOINTMENT (OUTPATIENT)
Dept: RADIOLOGY | Facility: IMAGING CENTER | Age: 69
End: 2024-06-05

## 2024-07-22 NOTE — REVIEW OF SYSTEMS
[FreeTextEntry1] : Hearing loss - Audiogram today with HF SNHL - recommend HAE - f/u audio yearly  dizziness: - sounds like may have been BPPV but no longer with vertigo and reports intermittent light-headedness - can consider VNG if symptoms persist; she will let us know - recommend fall therapy since she has already had a fall with fracture and is feeling unsteady
[Negative] : Heme/Lymph

## 2024-07-26 ENCOUNTER — RX RENEWAL (OUTPATIENT)
Age: 69
End: 2024-07-26

## 2024-08-30 NOTE — H&P PST ADULT - NSANTHGENDERRD_ENT_A_CORE
Please send NP Packet (both english and Vietnamese) out in mail to [address] Address on file.   No

## 2024-10-07 ENCOUNTER — RX RENEWAL (OUTPATIENT)
Age: 69
End: 2024-10-07

## 2025-04-02 DIAGNOSIS — R05.9 COUGH, UNSPECIFIED: ICD-10-CM

## 2025-04-02 RX ORDER — AZITHROMYCIN 250 MG/1
250 TABLET, FILM COATED ORAL
Qty: 1 | Refills: 0 | Status: ACTIVE | COMMUNITY
Start: 2025-04-02 | End: 1900-01-01

## 2025-07-14 ENCOUNTER — RX RENEWAL (OUTPATIENT)
Age: 70
End: 2025-07-14

## 2025-08-26 ENCOUNTER — APPOINTMENT (OUTPATIENT)
Dept: INTERNAL MEDICINE | Facility: CLINIC | Age: 70
End: 2025-08-26
Payer: MEDICARE

## 2025-08-26 VITALS
SYSTOLIC BLOOD PRESSURE: 144 MMHG | BODY MASS INDEX: 36.76 KG/M2 | DIASTOLIC BLOOD PRESSURE: 85 MMHG | TEMPERATURE: 97.9 F | HEART RATE: 81 BPM | OXYGEN SATURATION: 97 % | WEIGHT: 182 LBS

## 2025-08-26 DIAGNOSIS — K21.9 GASTRO-ESOPHAGEAL REFLUX DISEASE W/OUT ESOPHAGITIS: ICD-10-CM

## 2025-08-26 DIAGNOSIS — R94.39 ABNORMAL RESULT OF OTHER CARDIOVASCULAR FUNCTION STUDY: ICD-10-CM

## 2025-08-26 DIAGNOSIS — E11.9 TYPE 2 DIABETES MELLITUS W/OUT COMPLICATIONS: ICD-10-CM

## 2025-08-26 PROCEDURE — 99214 OFFICE O/P EST MOD 30 MIN: CPT | Mod: 25

## 2025-09-11 ENCOUNTER — APPOINTMENT (OUTPATIENT)
Dept: INTERNAL MEDICINE | Facility: CLINIC | Age: 70
End: 2025-09-11
Payer: MEDICARE

## 2025-09-11 VITALS — SYSTOLIC BLOOD PRESSURE: 142 MMHG | DIASTOLIC BLOOD PRESSURE: 86 MMHG

## 2025-09-11 VITALS
HEIGHT: 59 IN | OXYGEN SATURATION: 98 % | BODY MASS INDEX: 36.49 KG/M2 | SYSTOLIC BLOOD PRESSURE: 159 MMHG | WEIGHT: 181 LBS | HEART RATE: 75 BPM | DIASTOLIC BLOOD PRESSURE: 99 MMHG | TEMPERATURE: 98 F

## 2025-09-11 DIAGNOSIS — Z98.890 OTHER SPECIFIED POSTPROCEDURAL STATES: ICD-10-CM

## 2025-09-11 DIAGNOSIS — Z23 ENCOUNTER FOR IMMUNIZATION: ICD-10-CM

## 2025-09-11 PROCEDURE — G0008: CPT

## 2025-09-11 PROCEDURE — 90662 IIV NO PRSV INCREASED AG IM: CPT

## 2025-09-11 PROCEDURE — 99213 OFFICE O/P EST LOW 20 MIN: CPT | Mod: 25
